# Patient Record
Sex: MALE | Employment: FULL TIME | URBAN - NONMETROPOLITAN AREA
[De-identification: names, ages, dates, MRNs, and addresses within clinical notes are randomized per-mention and may not be internally consistent; named-entity substitution may affect disease eponyms.]

---

## 2017-06-23 ENCOUNTER — NURSE ONLY (OUTPATIENT)
Dept: FAMILY MEDICINE CLINIC | Age: 37
End: 2017-06-23

## 2017-06-23 DIAGNOSIS — Z23 NEED FOR TDAP VACCINATION: Primary | ICD-10-CM

## 2017-06-23 PROCEDURE — 90715 TDAP VACCINE 7 YRS/> IM: CPT | Performed by: NURSE PRACTITIONER

## 2017-06-23 PROCEDURE — 90471 IMMUNIZATION ADMIN: CPT | Performed by: NURSE PRACTITIONER

## 2018-10-18 ENCOUNTER — OFFICE VISIT (OUTPATIENT)
Dept: FAMILY MEDICINE CLINIC | Age: 38
End: 2018-10-18
Payer: COMMERCIAL

## 2018-10-18 VITALS
HEIGHT: 72 IN | DIASTOLIC BLOOD PRESSURE: 82 MMHG | SYSTOLIC BLOOD PRESSURE: 120 MMHG | TEMPERATURE: 98.1 F | WEIGHT: 208.4 LBS | BODY MASS INDEX: 28.23 KG/M2 | HEART RATE: 108 BPM | RESPIRATION RATE: 20 BRPM

## 2018-10-18 DIAGNOSIS — K21.9 GASTROESOPHAGEAL REFLUX DISEASE WITHOUT ESOPHAGITIS: ICD-10-CM

## 2018-10-18 DIAGNOSIS — J02.9 SORE THROAT: Primary | ICD-10-CM

## 2018-10-18 DIAGNOSIS — B96.89 ACUTE BACTERIAL SINUSITIS: ICD-10-CM

## 2018-10-18 DIAGNOSIS — J01.90 ACUTE BACTERIAL SINUSITIS: ICD-10-CM

## 2018-10-18 LAB — STREPTOCOCCUS A RNA: NORMAL

## 2018-10-18 PROCEDURE — 87651 STREP A DNA AMP PROBE: CPT | Performed by: NURSE PRACTITIONER

## 2018-10-18 PROCEDURE — 99213 OFFICE O/P EST LOW 20 MIN: CPT | Performed by: NURSE PRACTITIONER

## 2018-10-18 RX ORDER — TRIAMCINOLONE ACETONIDE 40 MG/ML
40 INJECTION, SUSPENSION INTRA-ARTICULAR; INTRAMUSCULAR ONCE
Status: COMPLETED | OUTPATIENT
Start: 2018-10-18 | End: 2018-10-18

## 2018-10-18 RX ORDER — OMEPRAZOLE 20 MG/1
20 CAPSULE, DELAYED RELEASE ORAL DAILY
Qty: 30 CAPSULE | Refills: 11 | Status: SHIPPED | OUTPATIENT
Start: 2018-10-18 | End: 2019-11-20 | Stop reason: ALTCHOICE

## 2018-10-18 RX ORDER — CEFDINIR 300 MG/1
300 CAPSULE ORAL 2 TIMES DAILY
Qty: 20 CAPSULE | Refills: 0 | Status: SHIPPED | OUTPATIENT
Start: 2018-10-18 | End: 2018-10-28

## 2018-10-18 RX ADMIN — TRIAMCINOLONE ACETONIDE 40 MG: 40 INJECTION, SUSPENSION INTRA-ARTICULAR; INTRAMUSCULAR at 11:41

## 2018-10-18 ASSESSMENT — ENCOUNTER SYMPTOMS
SORE THROAT: 1
SINUS PRESSURE: 1

## 2018-10-18 ASSESSMENT — PATIENT HEALTH QUESTIONNAIRE - PHQ9
SUM OF ALL RESPONSES TO PHQ9 QUESTIONS 1 & 2: 0
2. FEELING DOWN, DEPRESSED OR HOPELESS: 0
SUM OF ALL RESPONSES TO PHQ QUESTIONS 1-9: 0
1. LITTLE INTEREST OR PLEASURE IN DOING THINGS: 0
SUM OF ALL RESPONSES TO PHQ QUESTIONS 1-9: 0

## 2018-10-18 NOTE — PROGRESS NOTES
Administrations This Visit     triamcinolone acetonide (KENALOG-40) injection 40 mg     Admin Date  10/18/2018  11:41 Action  Given Dose  40 mg Route  Intramuscular Site  Ventrogluteal Right Administered By  Manohar Orourke CMA ([facility])    Ordering Provider:  JONNA Hunt CNP    NDC:  3735-1830-11    Lot#:  [id number]    :  [employer] (PRIMARY CARE)    Patient Supplied?:  No                Patient instructed to remain in clinic for 20 minutes after injection and was advised to report any adverse reaction to me immediately.

## 2018-10-18 NOTE — PROGRESS NOTES
Sheryl Ray is a 34 y.o. male whopresents today for :  Chief Complaint   Patient presents with    Pharyngitis     fever yesterday running 103.7 and 104.7 the night before, post nasal drainage, and head congestion last seen 2014    Discuss Medications     would like a script for Prilosec       HPI:     HPI  Fever started Tuesday night. Stayed home yesterday. Fever went down to 102 during day. Last night fever up to 103.7. There is no problem list on file for this patient. Past Medical History:   Diagnosis Date    Chicken pox     Chronic low back pain       Past Surgical History:   Procedure Laterality Date    ANKLE SURGERY  1984    in [facility]    remove staple    ANKLE SURGERY       Family History   Problem Relation Age of Onset    Allergies Mother     Cancer Paternal Grandmother         lung    Cancer Paternal Grandfather         lung     Social History   Substance Use Topics    Smoking status: Current Some Day Smoker     Packs/day: 0.50     Types: Cigarettes    Smokeless tobacco: Never Used    Alcohol use Yes      Comment: beer daily      Current Outpatient Prescriptions   Medication Sig Dispense Refill    Omeprazole Magnesium (PRILOSEC OTC PO) Take by mouth as needed      cefdinir (OMNICEF) 300 MG capsule Take 1 capsule by mouth 2 times daily for 10 days 20 capsule 0    omeprazole (PRILOSEC) 20 MG delayed release capsule Take 1 capsule by mouth daily 30 capsule 11     No current facility-administered medications for this visit. No Known Allergies  Health Maintenance   Topic Date Due    HIV screen  06/18/1993    Lipid screen  06/18/2018    Flu vaccine (1) 09/01/2018    DTaP/Tdap/Td vaccine (2 - Td) 06/23/2027       Subjective:     Review of Systems   Constitutional: Positive for fever. HENT: Positive for congestion, sinus pressure and sore throat. Hematological: Positive for adenopathy.        Objective:     Vitals:    10/18/18 1101   BP: 120/82   Site:

## 2019-09-19 ENCOUNTER — OFFICE VISIT (OUTPATIENT)
Dept: FAMILY MEDICINE CLINIC | Age: 39
End: 2019-09-19
Payer: COMMERCIAL

## 2019-09-19 VITALS
TEMPERATURE: 97.3 F | DIASTOLIC BLOOD PRESSURE: 94 MMHG | HEIGHT: 72 IN | HEART RATE: 86 BPM | RESPIRATION RATE: 18 BRPM | SYSTOLIC BLOOD PRESSURE: 160 MMHG | WEIGHT: 208.2 LBS | BODY MASS INDEX: 28.2 KG/M2 | OXYGEN SATURATION: 98 %

## 2019-09-19 DIAGNOSIS — F41.9 ANXIETY: ICD-10-CM

## 2019-09-19 DIAGNOSIS — I10 ESSENTIAL HYPERTENSION: ICD-10-CM

## 2019-09-19 DIAGNOSIS — K42.9 UMBILICAL HERNIA WITHOUT OBSTRUCTION AND WITHOUT GANGRENE: Primary | ICD-10-CM

## 2019-09-19 PROCEDURE — 99213 OFFICE O/P EST LOW 20 MIN: CPT | Performed by: NURSE PRACTITIONER

## 2019-09-19 RX ORDER — LISINOPRIL 5 MG/1
5 TABLET ORAL DAILY
Qty: 30 TABLET | Refills: 3 | Status: SHIPPED | OUTPATIENT
Start: 2019-09-19 | End: 2019-11-20 | Stop reason: ALTCHOICE

## 2019-09-19 ASSESSMENT — PATIENT HEALTH QUESTIONNAIRE - PHQ9
2. FEELING DOWN, DEPRESSED OR HOPELESS: 0
1. LITTLE INTEREST OR PLEASURE IN DOING THINGS: 0
SUM OF ALL RESPONSES TO PHQ QUESTIONS 1-9: 0
SUM OF ALL RESPONSES TO PHQ9 QUESTIONS 1 & 2: 0
SUM OF ALL RESPONSES TO PHQ QUESTIONS 1-9: 0

## 2019-09-19 ASSESSMENT — ENCOUNTER SYMPTOMS
EYES NEGATIVE: 1
ABDOMINAL PAIN: 1
RESPIRATORY NEGATIVE: 1

## 2019-11-19 PROBLEM — K42.9 UMBILICAL HERNIA WITHOUT OBSTRUCTION AND WITHOUT GANGRENE: Status: ACTIVE | Noted: 2019-11-19

## 2019-11-20 ENCOUNTER — TELEPHONE (OUTPATIENT)
Dept: SURGERY | Age: 39
End: 2019-11-20

## 2019-11-20 ENCOUNTER — OFFICE VISIT (OUTPATIENT)
Dept: SURGERY | Age: 39
End: 2019-11-20
Payer: COMMERCIAL

## 2019-11-20 VITALS
HEART RATE: 100 BPM | BODY MASS INDEX: 26.72 KG/M2 | TEMPERATURE: 98.6 F | SYSTOLIC BLOOD PRESSURE: 132 MMHG | HEIGHT: 72 IN | DIASTOLIC BLOOD PRESSURE: 72 MMHG | WEIGHT: 197.3 LBS | RESPIRATION RATE: 18 BRPM | OXYGEN SATURATION: 99 %

## 2019-11-20 DIAGNOSIS — K42.9 UMBILICAL HERNIA WITHOUT OBSTRUCTION AND WITHOUT GANGRENE: ICD-10-CM

## 2019-11-20 PROCEDURE — 99202 OFFICE O/P NEW SF 15 MIN: CPT | Performed by: SURGERY

## 2019-11-20 ASSESSMENT — ENCOUNTER SYMPTOMS
CHOKING: 0
COLOR CHANGE: 0
EYE ITCHING: 0
ABDOMINAL PAIN: 0
NAUSEA: 0
BACK PAIN: 0
EYE REDNESS: 0
FACIAL SWELLING: 0
SHORTNESS OF BREATH: 0
VOMITING: 0
ANAL BLEEDING: 0
EYE PAIN: 0
EYE DISCHARGE: 0
SORE THROAT: 0
RECTAL PAIN: 0
CHEST TIGHTNESS: 0
PHOTOPHOBIA: 0
BLOOD IN STOOL: 0
APNEA: 0
DIARRHEA: 0
COUGH: 0
STRIDOR: 0
VOICE CHANGE: 0
SINUS PRESSURE: 0
TROUBLE SWALLOWING: 0
RHINORRHEA: 0
ABDOMINAL DISTENTION: 0
CONSTIPATION: 0
ROS SKIN COMMENTS: UMBILICAL HERNIA
WHEEZING: 0

## 2019-12-02 ENCOUNTER — TELEPHONE (OUTPATIENT)
Dept: SURGERY | Age: 39
End: 2019-12-02

## 2019-12-05 DIAGNOSIS — K21.9 GASTROESOPHAGEAL REFLUX DISEASE WITHOUT ESOPHAGITIS: ICD-10-CM

## 2019-12-06 RX ORDER — OMEPRAZOLE 20 MG/1
CAPSULE, DELAYED RELEASE ORAL
Qty: 30 CAPSULE | Refills: 8 | Status: SHIPPED | OUTPATIENT
Start: 2019-12-06

## 2019-12-12 ENCOUNTER — ANESTHESIA (OUTPATIENT)
Dept: OPERATING ROOM | Age: 39
End: 2019-12-12
Payer: COMMERCIAL

## 2019-12-12 ENCOUNTER — ANESTHESIA EVENT (OUTPATIENT)
Dept: OPERATING ROOM | Age: 39
End: 2019-12-12
Payer: COMMERCIAL

## 2019-12-12 ENCOUNTER — HOSPITAL ENCOUNTER (OUTPATIENT)
Age: 39
Setting detail: OUTPATIENT SURGERY
Discharge: HOME | End: 2019-12-12
Attending: SURGERY | Admitting: SURGERY
Payer: COMMERCIAL

## 2019-12-12 VITALS
DIASTOLIC BLOOD PRESSURE: 76 MMHG | BODY MASS INDEX: 25.73 KG/M2 | HEART RATE: 84 BPM | SYSTOLIC BLOOD PRESSURE: 132 MMHG | WEIGHT: 190 LBS | TEMPERATURE: 99 F | RESPIRATION RATE: 16 BRPM | OXYGEN SATURATION: 97 % | HEIGHT: 72 IN

## 2019-12-12 VITALS — DIASTOLIC BLOOD PRESSURE: 74 MMHG | OXYGEN SATURATION: 97 % | SYSTOLIC BLOOD PRESSURE: 136 MMHG

## 2019-12-12 DIAGNOSIS — Z09 S/P UMBILICAL HERNIA REPAIR, FOLLOW-UP EXAM: ICD-10-CM

## 2019-12-12 DIAGNOSIS — K42.9 UMBILICAL HERNIA WITHOUT OBSTRUCTION AND WITHOUT GANGRENE: Primary | ICD-10-CM

## 2019-12-12 PROCEDURE — 3700000001 HC ADD 15 MINUTES (ANESTHESIA): Performed by: SURGERY

## 2019-12-12 PROCEDURE — 6360000002 HC RX W HCPCS: Performed by: NURSE ANESTHETIST, CERTIFIED REGISTERED

## 2019-12-12 PROCEDURE — 6370000000 HC RX 637 (ALT 250 FOR IP): Performed by: SURGERY

## 2019-12-12 PROCEDURE — 7100000011 HC PHASE II RECOVERY - ADDTL 15 MIN: Performed by: SURGERY

## 2019-12-12 PROCEDURE — 3600000012 HC SURGERY LEVEL 2 ADDTL 15MIN: Performed by: SURGERY

## 2019-12-12 PROCEDURE — 6360000002 HC RX W HCPCS: Performed by: SURGERY

## 2019-12-12 PROCEDURE — 2580000003 HC RX 258: Performed by: SURGERY

## 2019-12-12 PROCEDURE — 2500000003 HC RX 250 WO HCPCS: Performed by: SURGERY

## 2019-12-12 PROCEDURE — 7100000010 HC PHASE II RECOVERY - FIRST 15 MIN: Performed by: SURGERY

## 2019-12-12 PROCEDURE — C1781 MESH (IMPLANTABLE): HCPCS | Performed by: SURGERY

## 2019-12-12 PROCEDURE — 3600000002 HC SURGERY LEVEL 2 BASE: Performed by: SURGERY

## 2019-12-12 PROCEDURE — 49587 REPAIR UMBILICAL HERN,5+Y/O,STRANG: CPT | Performed by: SURGERY

## 2019-12-12 PROCEDURE — 2709999900 HC NON-CHARGEABLE SUPPLY: Performed by: SURGERY

## 2019-12-12 PROCEDURE — 3700000000 HC ANESTHESIA ATTENDED CARE: Performed by: SURGERY

## 2019-12-12 DEVICE — IMPLANTABLE DEVICE: Type: IMPLANTABLE DEVICE | Site: UMBILICAL | Status: FUNCTIONAL

## 2019-12-12 RX ORDER — SODIUM CHLORIDE 0.9 % (FLUSH) 0.9 %
10 SYRINGE (ML) INJECTION EVERY 12 HOURS SCHEDULED
Status: DISCONTINUED | OUTPATIENT
Start: 2019-12-12 | End: 2019-12-12 | Stop reason: HOSPADM

## 2019-12-12 RX ORDER — ONDANSETRON 2 MG/ML
INJECTION INTRAMUSCULAR; INTRAVENOUS PRN
Status: DISCONTINUED | OUTPATIENT
Start: 2019-12-12 | End: 2019-12-12 | Stop reason: SDUPTHER

## 2019-12-12 RX ORDER — PROPOFOL 10 MG/ML
INJECTION, EMULSION INTRAVENOUS PRN
Status: DISCONTINUED | OUTPATIENT
Start: 2019-12-12 | End: 2019-12-12 | Stop reason: SDUPTHER

## 2019-12-12 RX ORDER — KETOROLAC TROMETHAMINE 30 MG/ML
30 INJECTION, SOLUTION INTRAMUSCULAR; INTRAVENOUS ONCE
Status: COMPLETED | OUTPATIENT
Start: 2019-12-12 | End: 2019-12-12

## 2019-12-12 RX ORDER — SODIUM CHLORIDE 0.9 % (FLUSH) 0.9 %
10 SYRINGE (ML) INJECTION PRN
Status: DISCONTINUED | OUTPATIENT
Start: 2019-12-12 | End: 2019-12-12 | Stop reason: HOSPADM

## 2019-12-12 RX ORDER — ACETAMINOPHEN AND CODEINE PHOSPHATE 300; 30 MG/1; MG/1
1 TABLET ORAL EVERY 6 HOURS PRN
Qty: 28 TABLET | Refills: 0 | Status: SHIPPED | OUTPATIENT
Start: 2019-12-12 | End: 2019-12-19

## 2019-12-12 RX ORDER — SODIUM CHLORIDE 9 MG/ML
INJECTION, SOLUTION INTRAVENOUS CONTINUOUS
Status: DISCONTINUED | OUTPATIENT
Start: 2019-12-12 | End: 2019-12-12 | Stop reason: HOSPADM

## 2019-12-12 RX ORDER — FENTANYL CITRATE 50 UG/ML
INJECTION, SOLUTION INTRAMUSCULAR; INTRAVENOUS PRN
Status: DISCONTINUED | OUTPATIENT
Start: 2019-12-12 | End: 2019-12-12 | Stop reason: SDUPTHER

## 2019-12-12 RX ORDER — MIDAZOLAM HYDROCHLORIDE 1 MG/ML
INJECTION INTRAMUSCULAR; INTRAVENOUS PRN
Status: DISCONTINUED | OUTPATIENT
Start: 2019-12-12 | End: 2019-12-12 | Stop reason: SDUPTHER

## 2019-12-12 RX ORDER — ACETAMINOPHEN 500 MG
1000 TABLET ORAL ONCE
Status: COMPLETED | OUTPATIENT
Start: 2019-12-12 | End: 2019-12-12

## 2019-12-12 RX ADMIN — PROPOFOL 50 MG: 10 INJECTION, EMULSION INTRAVENOUS at 14:46

## 2019-12-12 RX ADMIN — Medication 2 G: at 14:41

## 2019-12-12 RX ADMIN — ONDANSETRON HYDROCHLORIDE 4 MG: 4 INJECTION, SOLUTION INTRAMUSCULAR; INTRAVENOUS at 14:45

## 2019-12-12 RX ADMIN — MIDAZOLAM HYDROCHLORIDE 2 MG: 1 INJECTION, SOLUTION INTRAMUSCULAR; INTRAVENOUS at 14:37

## 2019-12-12 RX ADMIN — FENTANYL CITRATE 50 MCG: 50 INJECTION INTRAMUSCULAR; INTRAVENOUS at 14:39

## 2019-12-12 RX ADMIN — FENTANYL CITRATE 50 MCG: 50 INJECTION INTRAMUSCULAR; INTRAVENOUS at 14:41

## 2019-12-12 RX ADMIN — KETOROLAC TROMETHAMINE 30 MG: 30 INJECTION, SOLUTION INTRAMUSCULAR at 13:11

## 2019-12-12 RX ADMIN — PROPOFOL 50 MG: 10 INJECTION, EMULSION INTRAVENOUS at 14:43

## 2019-12-12 RX ADMIN — SODIUM CHLORIDE: 9 INJECTION, SOLUTION INTRAVENOUS at 12:52

## 2019-12-12 RX ADMIN — PROPOFOL 50 MG: 10 INJECTION, EMULSION INTRAVENOUS at 14:53

## 2019-12-12 RX ADMIN — ACETAMINOPHEN 1000 MG: 500 TABLET ORAL at 13:11

## 2019-12-12 RX ADMIN — PROPOFOL 50 MG: 10 INJECTION, EMULSION INTRAVENOUS at 14:51

## 2019-12-12 ASSESSMENT — PULMONARY FUNCTION TESTS
PIF_VALUE: 0
PIF_VALUE: 1
PIF_VALUE: 0
PIF_VALUE: 0
PIF_VALUE: 1
PIF_VALUE: 0

## 2019-12-12 ASSESSMENT — PAIN DESCRIPTION - ORIENTATION
ORIENTATION: MID
ORIENTATION: MID

## 2019-12-12 ASSESSMENT — PAIN DESCRIPTION - LOCATION
LOCATION: ABDOMEN
LOCATION: ABDOMEN

## 2019-12-12 ASSESSMENT — PAIN SCALES - GENERAL
PAINLEVEL_OUTOF10: 0
PAINLEVEL_OUTOF10: 0
PAINLEVEL_OUTOF10: 1
PAINLEVEL_OUTOF10: 1

## 2019-12-12 ASSESSMENT — PAIN DESCRIPTION - DESCRIPTORS
DESCRIPTORS: DISCOMFORT
DESCRIPTORS: DISCOMFORT

## 2019-12-12 ASSESSMENT — PAIN DESCRIPTION - PAIN TYPE
TYPE: SURGICAL PAIN
TYPE: SURGICAL PAIN

## 2019-12-12 ASSESSMENT — PAIN - FUNCTIONAL ASSESSMENT: PAIN_FUNCTIONAL_ASSESSMENT: 0-10

## 2019-12-13 ENCOUNTER — TELEPHONE (OUTPATIENT)
Dept: SURGERY | Age: 39
End: 2019-12-13

## 2019-12-23 ENCOUNTER — OFFICE VISIT (OUTPATIENT)
Dept: SURGERY | Age: 39
End: 2019-12-23

## 2019-12-23 VITALS
RESPIRATION RATE: 18 BRPM | TEMPERATURE: 97.8 F | HEART RATE: 95 BPM | HEIGHT: 72 IN | OXYGEN SATURATION: 97 % | DIASTOLIC BLOOD PRESSURE: 80 MMHG | WEIGHT: 190.04 LBS | BODY MASS INDEX: 25.74 KG/M2 | SYSTOLIC BLOOD PRESSURE: 122 MMHG

## 2019-12-23 DIAGNOSIS — Z09 S/P UMBILICAL HERNIA REPAIR, FOLLOW-UP EXAM: Primary | ICD-10-CM

## 2019-12-23 PROCEDURE — 99024 POSTOP FOLLOW-UP VISIT: CPT | Performed by: SURGERY

## 2020-01-11 PROBLEM — Z09 S/P UMBILICAL HERNIA REPAIR, FOLLOW-UP EXAM: Status: RESOLVED | Noted: 2019-12-12 | Resolved: 2020-01-11

## 2020-01-13 ENCOUNTER — OFFICE VISIT (OUTPATIENT)
Dept: FAMILY MEDICINE CLINIC | Age: 40
End: 2020-01-13
Payer: COMMERCIAL

## 2020-01-13 VITALS
BODY MASS INDEX: 25.74 KG/M2 | DIASTOLIC BLOOD PRESSURE: 88 MMHG | HEIGHT: 72 IN | WEIGHT: 190.04 LBS | SYSTOLIC BLOOD PRESSURE: 138 MMHG | HEART RATE: 100 BPM | RESPIRATION RATE: 16 BRPM

## 2020-01-13 PROCEDURE — 99214 OFFICE O/P EST MOD 30 MIN: CPT | Performed by: NURSE PRACTITIONER

## 2020-01-13 RX ORDER — ESCITALOPRAM OXALATE 10 MG/1
10 TABLET ORAL DAILY
Qty: 30 TABLET | Refills: 5 | Status: SHIPPED | OUTPATIENT
Start: 2020-01-13 | End: 2020-02-27

## 2020-01-13 RX ORDER — PREDNISONE 1 MG/1
TABLET ORAL
Qty: 45 TABLET | Refills: 0 | Status: SHIPPED | OUTPATIENT
Start: 2020-01-13 | End: 2020-05-18 | Stop reason: SDUPTHER

## 2020-01-13 ASSESSMENT — PATIENT HEALTH QUESTIONNAIRE - PHQ9
1. LITTLE INTEREST OR PLEASURE IN DOING THINGS: 0
SUM OF ALL RESPONSES TO PHQ QUESTIONS 1-9: 0
SUM OF ALL RESPONSES TO PHQ QUESTIONS 1-9: 0
SUM OF ALL RESPONSES TO PHQ9 QUESTIONS 1 & 2: 0
2. FEELING DOWN, DEPRESSED OR HOPELESS: 0

## 2020-01-13 ASSESSMENT — ENCOUNTER SYMPTOMS
EYES NEGATIVE: 1
GASTROINTESTINAL NEGATIVE: 1
RESPIRATORY NEGATIVE: 1

## 2020-01-13 NOTE — LETTER
[address] [facility] [address]  Phone: [phone]  Fax: [phone]    JONNA Thorpe CNP        January 13, 2020     Patient: Dell Smith   YOB: 1980   Date of Visit: 1/13/2020       To Whom It May Concern: It is my medical opinion that Eduarda Crawford should remain out of work until 1/20/2020. If you have any questions or concerns, please don't hesitate to call.     Sincerely,          JONNA Thorpe CNP

## 2020-01-13 NOTE — PROGRESS NOTES
day 1 taper by 5mg daily 45 tablet 0    omeprazole (PRILOSEC) 20 MG delayed release capsule take 1 capsule by mouth once daily 30 capsule 8     No current facility-administered medications for this visit. No Known Allergies  Health Maintenance   Topic Date Due    Pneumococcal 0-64 years Vaccine (1 of 1 - PPSV23) 06/18/1984    HIV screen  06/18/1993    Lipid screen  06/18/2018    Diabetes screen  06/18/2018    Flu vaccine (1) 09/19/2020 (Originally 9/1/2019)    DTaP/Tdap/Td vaccine (2 - Td) 06/23/2027       Subjective:     Review of Systems   Constitutional: Negative. HENT: Negative. Eyes: Negative. Respiratory: Negative. Cardiovascular: Negative. Gastrointestinal: Negative. Musculoskeletal: Positive for joint swelling and myalgias. Skin: Negative. Neurological: Negative. Psychiatric/Behavioral: The patient is nervous/anxious. Objective:     Vitals:    01/13/20 1439 01/13/20 1513   BP: (!) 140/96 138/88   Site: Left Upper Arm    Position: Sitting    Cuff Size: Medium Adult    Pulse: 100    Resp: 16    Weight: 190 lb 0.6 oz (86.2 kg)    Height: 6' 0.01\" (1.829 m)        Physical Exam  Constitutional:       Appearance: He is well-developed. HENT:      Head: Normocephalic. Right Ear: Tympanic membrane and external ear normal.      Left Ear: Tympanic membrane and external ear normal.      Nose: Nose normal.   Neck:      Musculoskeletal: Normal range of motion and neck supple. Cardiovascular:      Rate and Rhythm: Normal rate and regular rhythm. Heart sounds: Normal heart sounds. No murmur. No friction rub. No gallop. Pulmonary:      Effort: Pulmonary effort is normal.      Breath sounds: Normal breath sounds. No wheezing or rales. Abdominal:      General: Bowel sounds are normal.      Palpations: Abdomen is soft. Tenderness: There is no tenderness. There is no guarding. Musculoskeletal: Normal range of motion. Left ankle: He exhibits swelling.

## 2020-01-14 RX ORDER — OMEPRAZOLE 20 MG/1
CAPSULE, DELAYED RELEASE ORAL
Qty: 30 CAPSULE | Refills: 8 | Status: CANCELLED | OUTPATIENT
Start: 2020-01-14

## 2020-01-14 NOTE — TELEPHONE ENCOUNTER
Patient asking for only Prilosec to be dispensed as the generic Omeprazole does not work for him. Please advise.

## 2020-01-17 ENCOUNTER — TELEPHONE (OUTPATIENT)
Dept: FAMILY MEDICINE CLINIC | Age: 40
End: 2020-01-17

## 2020-01-17 NOTE — TELEPHONE ENCOUNTER
Letter was written on 1/13/20    Attempted to reach, left detailed message regarding letter ready for .

## 2020-02-27 ENCOUNTER — OFFICE VISIT (OUTPATIENT)
Dept: FAMILY MEDICINE CLINIC | Age: 40
End: 2020-02-27
Payer: COMMERCIAL

## 2020-02-27 VITALS
TEMPERATURE: 98 F | BODY MASS INDEX: 24.35 KG/M2 | HEART RATE: 82 BPM | RESPIRATION RATE: 14 BRPM | DIASTOLIC BLOOD PRESSURE: 82 MMHG | WEIGHT: 179.8 LBS | SYSTOLIC BLOOD PRESSURE: 124 MMHG | HEIGHT: 72 IN

## 2020-02-27 PROCEDURE — 99214 OFFICE O/P EST MOD 30 MIN: CPT | Performed by: NURSE PRACTITIONER

## 2020-02-27 RX ORDER — ESCITALOPRAM OXALATE 20 MG/1
20 TABLET ORAL DAILY
Qty: 30 TABLET | Refills: 5 | Status: SHIPPED | OUTPATIENT
Start: 2020-02-27 | End: 2020-06-11

## 2020-02-27 RX ORDER — ALPRAZOLAM 0.25 MG/1
0.25 TABLET ORAL 3 TIMES DAILY PRN
Qty: 21 TABLET | Refills: 1 | Status: SHIPPED | OUTPATIENT
Start: 2020-02-27 | End: 2020-03-17 | Stop reason: SDUPTHER

## 2020-02-27 ASSESSMENT — ENCOUNTER SYMPTOMS
DIARRHEA: 1
VOMITING: 1
RESPIRATORY NEGATIVE: 1
EYES NEGATIVE: 1
NAUSEA: 1

## 2020-02-27 NOTE — PROGRESS NOTES
current facility-administered medications for this visit. No Known Allergies  Health Maintenance   Topic Date Due    Pneumococcal 0-64 years Vaccine (1 of 1 - PPSV23) 06/18/1984    HIV screen  06/18/1993    Lipid screen  06/18/2018    Diabetes screen  06/18/2018    Flu vaccine (1) 09/19/2020 (Originally 9/1/2019)    DTaP/Tdap/Td vaccine (2 - Td) 06/23/2027    Shingles Vaccine (1 of 2) 06/18/2028    Hepatitis A vaccine  Aged Out    Hepatitis B vaccine  Aged Out    Hib vaccine  Aged Out    Meningococcal (ACWY) vaccine  Aged Out       Subjective:     Review of Systems   Constitutional: Negative. HENT: Negative. Eyes: Negative. Respiratory: Negative. Cardiovascular: Negative. Gastrointestinal: Positive for diarrhea, nausea and vomiting. Musculoskeletal: Negative. Skin: Negative. Neurological: Negative. Psychiatric/Behavioral: The patient is nervous/anxious. Objective:     Vitals:    02/27/20 1338 02/27/20 1646   BP: (!) 142/90 124/82   Site: Left Upper Arm    Position: Sitting    Cuff Size: Medium Adult    Pulse: 82    Resp: 14    Temp: 98 °F (36.7 °C)    TempSrc: Temporal    Weight: 179 lb 12.8 oz (81.6 kg)    Height: 6' 0.01\" (1.829 m)        Physical Exam  Constitutional:       Appearance: He is well-developed. HENT:      Head: Normocephalic. Right Ear: Tympanic membrane and external ear normal.      Left Ear: Tympanic membrane and external ear normal.      Nose: Nose normal.   Neck:      Musculoskeletal: Normal range of motion and neck supple. Cardiovascular:      Rate and Rhythm: Normal rate and regular rhythm. Heart sounds: Normal heart sounds. No murmur. No friction rub. No gallop. Pulmonary:      Effort: Pulmonary effort is normal.      Breath sounds: Normal breath sounds. No wheezing or rales. Abdominal:      General: Bowel sounds are normal.      Palpations: Abdomen is soft. Tenderness: There is no abdominal tenderness.  There is no

## 2020-03-17 RX ORDER — ALPRAZOLAM 0.25 MG/1
0.25 TABLET ORAL 3 TIMES DAILY PRN
Qty: 21 TABLET | Refills: 1 | Status: SHIPPED | OUTPATIENT
Start: 2020-03-17 | End: 2020-06-11 | Stop reason: SDUPTHER

## 2020-03-17 NOTE — TELEPHONE ENCOUNTER
Patient currently taking Lexapro 20mg and xanax 0.25mg and is doing well on both    His script for xanax is written 1 tab up to 3 x daily for 7 days. He is only taking 1 tab bid He is wanting new script for the xanax 30 day supply if possible, states would be more convenient for him.     [facility]OO [facility]

## 2020-03-23 ENCOUNTER — TELEPHONE (OUTPATIENT)
Dept: FAMILY MEDICINE CLINIC | Age: 40
End: 2020-03-23

## 2020-03-23 NOTE — TELEPHONE ENCOUNTER
Pt should not go to work.   Current guidelines are to be off work for at least 3 days after symptoms resolve

## 2020-03-23 NOTE — TELEPHONE ENCOUNTER
See email forwarded from patient. He had fever 102 last night took motrin.     99 this am.    Wants to return to work

## 2020-05-18 ENCOUNTER — TELEPHONE (OUTPATIENT)
Dept: FAMILY MEDICINE CLINIC | Age: 40
End: 2020-05-18

## 2020-05-18 RX ORDER — PREDNISONE 1 MG/1
TABLET ORAL
Qty: 45 TABLET | Refills: 0 | Status: SHIPPED | OUTPATIENT
Start: 2020-05-18 | End: 2020-05-28

## 2020-06-11 ENCOUNTER — OFFICE VISIT (OUTPATIENT)
Dept: FAMILY MEDICINE CLINIC | Age: 40
End: 2020-06-11
Payer: COMMERCIAL

## 2020-06-11 VITALS
SYSTOLIC BLOOD PRESSURE: 120 MMHG | WEIGHT: 175 LBS | HEART RATE: 74 BPM | TEMPERATURE: 98 F | RESPIRATION RATE: 16 BRPM | BODY MASS INDEX: 23.73 KG/M2 | DIASTOLIC BLOOD PRESSURE: 74 MMHG

## 2020-06-11 PROCEDURE — 99215 OFFICE O/P EST HI 40 MIN: CPT | Performed by: FAMILY MEDICINE

## 2020-06-11 RX ORDER — PREDNISONE 50 MG/1
50 TABLET ORAL DAILY
Qty: 5 TABLET | Refills: 0 | Status: SHIPPED | OUTPATIENT
Start: 2020-06-11 | End: 2020-06-16

## 2020-06-11 RX ORDER — ALPRAZOLAM 0.25 MG/1
0.25 TABLET ORAL 3 TIMES DAILY PRN
Qty: 21 TABLET | Refills: 1 | Status: SHIPPED | OUTPATIENT
Start: 2020-06-11 | End: 2020-08-14 | Stop reason: SDUPTHER

## 2020-06-11 RX ORDER — DESVENLAFAXINE 50 MG/1
50 TABLET, EXTENDED RELEASE ORAL DAILY
Qty: 14 TABLET | Refills: 0 | Status: SHIPPED | OUTPATIENT
Start: 2020-06-11 | End: 2021-08-03

## 2020-06-11 RX ORDER — DESVENLAFAXINE 100 MG/1
100 TABLET, EXTENDED RELEASE ORAL DAILY
Qty: 30 TABLET | Refills: 5 | Status: SHIPPED | OUTPATIENT
Start: 2020-06-11 | End: 2021-08-03

## 2020-06-11 RX ORDER — PANTOPRAZOLE SODIUM 40 MG/1
40 TABLET, DELAYED RELEASE ORAL
Qty: 90 TABLET | Refills: 1 | Status: SHIPPED | OUTPATIENT
Start: 2020-06-11 | End: 2021-08-03

## 2020-06-11 RX ORDER — AZITHROMYCIN 250 MG/1
TABLET, FILM COATED ORAL
Qty: 1 PACKET | Refills: 0 | Status: SHIPPED | OUTPATIENT
Start: 2020-06-11 | End: 2022-01-18 | Stop reason: ALTCHOICE

## 2020-06-16 NOTE — PROGRESS NOTES
Office notes and demographics faxed to vein center
Vein clonic not open today.
worsen or fail to improve as anticipated.     Over 50 minutes spent with patient with >50% spent in counseling and coordination of care    Brooklyn Solis MD

## 2020-07-07 ENCOUNTER — TELEPHONE (OUTPATIENT)
Dept: FAMILY MEDICINE CLINIC | Age: 40
End: 2020-07-07

## 2020-07-07 NOTE — TELEPHONE ENCOUNTER
Patient will be coming back from [place] on 7/15/20 and would like to be tested for COVID, please advise at [phone]

## 2020-07-23 ENCOUNTER — TELEPHONE (OUTPATIENT)
Dept: FAMILY MEDICINE CLINIC | Age: 40
End: 2020-07-23

## 2020-07-23 NOTE — TELEPHONE ENCOUNTER
Which med the heart burn pill or the anxiety med. The anxiety med is at its max dose. For face masks we have been handing out notes.   There are few medical reasons that allow a person to not wear one

## 2020-07-23 NOTE — TELEPHONE ENCOUNTER
Patient was scheduled today for a 6 week f/u. He was called in to work and needs to r/s. Dr. María Berrios does not have anything open for me to schedule for several weeks and patient states he needs to be seen sooner. Patient states the medication is working good but would like a stronger dose. Would also like to have a written letter stating he does not need to wear a mask while at work.      Patient can be reached at [phone]

## 2020-08-14 RX ORDER — ALPRAZOLAM 0.25 MG/1
0.25 TABLET ORAL 3 TIMES DAILY PRN
Qty: 21 TABLET | Refills: 1 | Status: SHIPPED | OUTPATIENT
Start: 2020-08-14 | End: 2020-08-21

## 2020-08-14 NOTE — TELEPHONE ENCOUNTER
Kayele Nicholas called requesting a refill on the following medications:  Requested Prescriptions     Pending Prescriptions Disp Refills    ALPRAZolam (XANAX) 0.25 MG tablet 21 tablet 1     Sig: Take 1 tablet by mouth 3 times daily as needed for Anxiety for up to 7 days.      Pharmacy [facility]: [facility] [place]      Date of last visit: 6/11/20  Date of next visit (if applicable): Visit date not found

## 2020-11-03 ENCOUNTER — TELEPHONE (OUTPATIENT)
Dept: FAMILY MEDICINE CLINIC | Age: 40
End: 2020-11-03

## 2020-11-03 NOTE — TELEPHONE ENCOUNTER
Patient is calling in asking for an order to be checked for covid 19. He said for about 3-4 days he has been having sore throat, cough, runny nose. His work sent him to a [facility] clinic yesterday, they tried to check for covid but they made him bleed and couldn't use it. He would like to go anywhere that he can get tested and get results back asap.  Please call him to advise if ok for order or if need vv?

## 2020-11-04 LAB — SARS-COV-2: NOT DETECTED

## 2021-07-29 ENCOUNTER — NURSE TRIAGE (OUTPATIENT)
Dept: OTHER | Facility: CLINIC | Age: 41
End: 2021-07-29

## 2021-07-29 ENCOUNTER — TELEPHONE (OUTPATIENT)
Dept: FAMILY MEDICINE CLINIC | Age: 41
End: 2021-07-29

## 2021-07-29 DIAGNOSIS — K21.9 GASTROESOPHAGEAL REFLUX DISEASE WITHOUT ESOPHAGITIS: ICD-10-CM

## 2021-07-29 DIAGNOSIS — R19.7 DIARRHEA OF PRESUMED INFECTIOUS ORIGIN: Primary | ICD-10-CM

## 2021-07-29 NOTE — TELEPHONE ENCOUNTER
Reason for Disposition   Vomiting occurs with dizziness    Answer Assessment - Initial Assessment Questions  1. DESCRIPTION: \"Describe your dizziness. \"         Light headed, nausea, felt like he could pass out, dizzy spell, coworker told him he looked green     2. LIGHTHEADED: \"Do you feel lightheaded? \" (e.g., somewhat faint, woozy, weak upon standing)          Light headed, woozy, weak like passing out     3. VERTIGO: \"Do you feel like either you or the room is spinning or tilting? \" (i.e. vertigo)           At times felt like things were spinning, if he would have closed his eyes he feels it really would have felt like the room would have been spinning     4. SEVERITY: \"How bad is it? \"  \"Do you feel like you are going to faint? \" \"Can you stand and walk? \"    - MILD - walking normally    - MODERATE - interferes with normal activities (e.g., work, school)     - SEVERE - unable to stand, requires support to walk, feels like passing out now. Just woke after sleeping 10 hrs, doesn't feel dizzy at this time, not like fainting at this time- 'doesn't feel right but not terrible\"     5. ONSET:  \"When did the dizziness begin? \"         Started about 4 days ago with nausea, vomiting x24hrs, fever 101.4, diarrhea, light headed     6. AGGRAVATING FACTORS: \"Does anything make it worse? \" (e.g., standing, change in head position)           Looking at the computer he got dizzy, standing outside he didn't feel well    7. HEART RATE: \"Can you tell me your heart rate? \" \"How many beats in 15 seconds? \"  (Note: not all patients can do this)              Denies any racing heart or anything with heart     8. CAUSE: \"What do you think is causing the dizziness? \"           Unsure     9. RECURRENT SYMPTOM: \"Have you had dizziness before? \" If so, ask: \"When was the last time? \" \"What happened that time? \"            Pt has been with standing too quickly and it is a brief dizziness and subsides     10.  OTHER SYMPTOMS: \"Do you have any

## 2021-08-02 ENCOUNTER — TELEPHONE (OUTPATIENT)
Dept: FAMILY MEDICINE CLINIC | Age: 41
End: 2021-08-02

## 2021-08-02 NOTE — TELEPHONE ENCOUNTER
----- Message from [facility] sent at 8/2/2021  9:56 AM EDT -----  Subject: Appointment Request    Reason for Call: Routine (Patient Request) No Script    QUESTIONS  Type of Appointment? Established Patient  Reason for appointment request? Available appointments did not meet   patient need  Additional Information for Provider? Patient called on 08/02 in regards to   wanting to see the provider or Jermaine Maynard, due to his left foot. Patient   stated he called last week and still having a lot of concerns for his left   foot and not being able pull it back and wanting to see the if he can get   in today or tomorrow the earliest. Patient stated due to issue, he has   been tripping a lot in just a couple of days not normal.   ---------------------------------------------------------------------------  --------------  CALL BACK INFO  What is the best way for the office to contact you? OK to leave message on   voicemail  Preferred Call Back Phone Number? [phone]  ---------------------------------------------------------------------------  --------------  SCRIPT ANSWERS  Relationship to Patient? Self  (Is the patient requesting to see the provider for a procedure?)? No  (Is the patient requesting to see the provider urgently  today or   tomorrow. )? No  Have you been diagnosed with, awaiting test results for, or told that you   are suspected of having COVID-19 (Coronavirus)? (If patient has tested   negative or was tested as a requirement for work, school, or travel and   not based on symptoms, answer no)? No  Do you currently have flu-like symptoms including fever or chills, cough,   shortness of breath, difficulty breathing, or new loss of taste or smell? No  Have you had close contact with someone with COVID-19 in the last 14 days? No  (Service Expert  click yes below to proceed with [employer] As Usual   Scheduling)?  Yes

## 2021-08-03 ENCOUNTER — OFFICE VISIT (OUTPATIENT)
Dept: FAMILY MEDICINE CLINIC | Age: 41
End: 2021-08-03
Payer: COMMERCIAL

## 2021-08-03 VITALS
HEART RATE: 97 BPM | TEMPERATURE: 97.6 F | DIASTOLIC BLOOD PRESSURE: 92 MMHG | OXYGEN SATURATION: 98 % | RESPIRATION RATE: 18 BRPM | WEIGHT: 186.6 LBS | SYSTOLIC BLOOD PRESSURE: 144 MMHG | HEIGHT: 72 IN | BODY MASS INDEX: 25.27 KG/M2

## 2021-08-03 DIAGNOSIS — M21.372 LEFT FOOT DROP: Primary | ICD-10-CM

## 2021-08-03 PROCEDURE — 99214 OFFICE O/P EST MOD 30 MIN: CPT | Performed by: NURSE PRACTITIONER

## 2021-08-03 RX ORDER — PREDNISONE 1 MG/1
TABLET ORAL
Qty: 45 TABLET | Refills: 0 | Status: SHIPPED | OUTPATIENT
Start: 2021-08-03 | End: 2021-08-13

## 2021-08-03 RX ORDER — TIZANIDINE 4 MG/1
4 TABLET ORAL 3 TIMES DAILY PRN
Qty: 30 TABLET | Refills: 0 | Status: SHIPPED | OUTPATIENT
Start: 2021-08-03

## 2021-08-03 ASSESSMENT — ENCOUNTER SYMPTOMS
EYES NEGATIVE: 1
RESPIRATORY NEGATIVE: 1
GASTROINTESTINAL NEGATIVE: 1

## 2021-08-03 NOTE — LETTER
[address] [facility] [address]  Phone: [phone]  Fax: [phone]    JONNA Becker CNP        August 3, 2021     Patient: Dipti Glez   YOB: 1980   Date of Visit: 8/3/2021       To Whom It May Concern: It is my medical opinion that Sanjuanita Lazaro should remain out of work until 8/10/21. Tacho Orourke has acute drop foot of left leg following an apparent viral illness. He is being referred to a specialist, treated with rest and steroids. If you have any questions or concerns, please don't hesitate to call.     Sincerely,          JONNA Becker CNP

## 2021-08-03 NOTE — PROGRESS NOTES
Whitney Crook is a 35 y.o. male whopresents today for :  Chief Complaint   Patient presents with    Foot Pain     left foot - Pt feels it's drop foot. Pt can't pull toes back or pull ankle back. Ongoing the past 4 days    Shoulder Pain     Rt shoulder x1 day        HPI:     HPI     very interesting pt. Reports last week he was very ill with a GI illness. Terrible diarrhea and fatigue for a few days. Towards the last day he got out of bed and fell. He thinks he fell because he couldn't lift his left foot but unsure as he could have injured in fall. However since then GI is better but has very limited dorseflexion on left foot. Right is fine. Has slight loss in sensation. No issues with back knee or hip. Also has chronic neck and right shoulder pain.     Patient Active Problem List   Diagnosis    Umbilical hernia without obstruction and without gangrene        Past Medical History:   Diagnosis Date    Anxiety     Chicken pox     Chronic low back pain     Fractures, stress 2012    right    GERD (gastroesophageal reflux disease)     Hypertension     no meds    Umbilical hernia 38/5717      Past Surgical History:   Procedure Laterality Date    ANKLE SURGERY  1984    in [facility] Bilateral 1996    remove staple-Dr Sanabria    TONSILLECTOMY AND ADENOIDECTOMY      as a child    UMBILICAL HERNIA REPAIR N/A 90/39/5222    HERNIA UMBILICAL REPAIR WITH MESH performed by Jose Guadalupe Reyna MD at [address] [place] [address] History   Problem Relation Age of Onset    Allergies Mother     Cancer Paternal Grandmother         lung    Cancer Paternal Grandfather         lung    No Known Problems Father      Social History     Tobacco Use    Smoking status: Current Some Day Smoker     Packs/day: 0.50     Types: Cigarettes    Smokeless tobacco: Never Used   Substance Use Topics    Alcohol use: Yes     Comment: beer daily      Current Outpatient Medications   Medication Sig Dispense Refill    predniSONE (DELTASONE) 5 MG tablet 9 tabs po day 1 taper by 5mg daily 45 tablet 0    tiZANidine (ZANAFLEX) 4 MG tablet Take 1 tablet by mouth 3 times daily as needed (muscle pain) 30 tablet 0    azithromycin (ZITHROMAX) 250 MG tablet Take 2 tabs (500 mg) on Day 1, and take 1 tab (250 mg) on days 2 through 5. 1 packet 0    omeprazole (PRILOSEC) 20 MG delayed release capsule take 1 capsule by mouth once daily 30 capsule 8    PRILOSEC 20 MG delayed release capsule Take 1 capsule by mouth 2 times daily 30 capsule 11     No current facility-administered medications for this visit. No Known Allergies  Health Maintenance   Topic Date Due    Hepatitis C screen  Never done    Pneumococcal 0-64 years Vaccine (1 of 2 - PPSV23) Never done    COVID-19 Vaccine (1) Never done    HIV screen  Never done    Lipid screen  Never done    Diabetes screen  Never done    Flu vaccine (1) 09/01/2021    DTaP/Tdap/Td vaccine (2 - Td or Tdap) 06/23/2027    Hepatitis A vaccine  Aged Out    Hepatitis B vaccine  Aged Out    Hib vaccine  Aged Out    Meningococcal (ACWY) vaccine  Aged Out       Subjective:     Review of Systems   Constitutional: Negative. HENT: Negative. Eyes: Negative. Respiratory: Negative. Cardiovascular: Negative. Gastrointestinal: Negative. Musculoskeletal: Positive for myalgias. Skin: Negative. Neurological: Positive for weakness. Objective:     Vitals:    08/03/21 1442   BP: (!) 144/92   Site: Left Upper Arm   Position: Sitting   Cuff Size: Medium Adult   Pulse: 97   Resp: 18   Temp: 97.6 °F (36.4 °C)   TempSrc: Temporal   SpO2: 98%   Weight: 186 lb 9.6 oz (84.6 kg)   Height: 6' 0.01\" (1.829 m)       Physical Exam  Constitutional:       Appearance: He is well-developed. HENT:      Head: Normocephalic.       Right Ear: Tympanic membrane and external ear normal.      Left Ear: Tympanic membrane and external ear normal.      Nose: Nose normal.   Cardiovascular:      Rate and Rhythm: Normal rate and regular rhythm. Heart sounds: Normal heart sounds. No murmur heard. No friction rub. No gallop. Pulmonary:      Effort: Pulmonary effort is normal.      Breath sounds: Normal breath sounds. No wheezing or rales. Abdominal:      General: Bowel sounds are normal.      Palpations: Abdomen is soft. Tenderness: There is no abdominal tenderness. There is no guarding. Musculoskeletal:         General: Normal range of motion. Cervical back: Normal range of motion and neck supple. Legs:    Lymphadenopathy:      Cervical: No cervical adenopathy. Skin:     General: Skin is warm. Neurological:      Mental Status: He is alert and oriented to person, place, and time. Deep Tendon Reflexes: Reflexes are normal and symmetric. Assessment:      Diagnosis Orders   1. Left foot drop  predniSONE (DELTASONE) 5 MG tablet    Alejandra Owens MD, [address], [address]:      No follow-ups on file. Orders Placed This Encounter   Procedures   Alejandra Owens MD, [address], [facility]     Referral Priority:   Urgent     Referral Type:   Eval and Treat     Referral Reason:   Specialty Services Required     Requested Specialty:   Pain Medicine     Number of Visits Requested:   1     Orders Placed This Encounter   Medications    predniSONE (DELTASONE) 5 MG tablet     Si tabs po day 1 taper by 5mg daily     Dispense:  45 tablet     Refill:  0    tiZANidine (ZANAFLEX) 4 MG tablet     Sig: Take 1 tablet by mouth 3 times daily as needed (muscle pain)     Dispense:  30 tablet     Refill:  0      Concerned with his foot drop. Unsure of cause. ?post viral, injur during fall. Will start prednisone and refer to physiatry  Did advised if has progressive weakness to let us know right away. Rule out GBS. Did give work note to rest until foot is stabilized.   He is having problems walking right now     Patient given educational materials - seepatient instructions. Discussed use, benefit, and side effects of prescribed medications. All patient questions answered. Pt voiced understanding. Patient agreed withtreatment plan. Follow up as directed.      Electronically signed by JONNA Mcgovern CNP on 8/3/2021 at 5:18 PM

## 2021-08-10 ENCOUNTER — TELEPHONE (OUTPATIENT)
Dept: FAMILY MEDICINE CLINIC | Age: 41
End: 2021-08-10

## 2021-08-10 DIAGNOSIS — M21.372 LEFT FOOT DROP: Primary | ICD-10-CM

## 2021-08-10 NOTE — TELEPHONE ENCOUNTER
LM on Michelle he at [facility] to call back to check on status of referral    Per Kedar Cerda at Dr Andreas Gonsales office, her next opening for new  pt is 8/30    Spoke with Rafiq Pace at Dr Keyana Barreto office, his next opening is mid/end of September. Was able to schedule with Dr Paul Pat next Tuesday 8/17 @ 845. Pt needs to be there by 830. [facility] [address] [facility] [place] [address] [phone]. Need ins card, id and list of meds    Patient aware and voiced understanding, no concerns voiced at this time. Pt requests that note state that he has an appt with podiatrist 8/17/21. Emailed updated work note to [email or web address]      Need referral entered and fax to Dr Paul Pat at [phone]

## 2021-08-10 NOTE — LETTER
[address] [facility] [address]  Phone: [phone]  Fax: [phone]    JONNA Moe CNP        August 10, 2021    To whom it concerns,    It is my medical opinion that Claritza Alaniz should remain out of work until 8/18/21. Malena Walker has acute drop foot of left leg following an apparent viral illness. He is being treated with rest and steroids. Malena Walker has been referred to a podiatrist Dr Gaurang Joseph. He has an appointment scheduled 8/17/21@ 845 am and future work status will be determined at that time by Dr Gaurang Joseph. If you have any questions or concerns, please don't hesitate to call.       Sincerely,        JONNA Moe CNP

## 2021-08-10 NOTE — TELEPHONE ENCOUNTER
Extend note another week. Also call dr lancaster or dr Kaity Hernandes and see if a podiatrist can see him right away.

## 2021-08-10 NOTE — TELEPHONE ENCOUNTER
----- Message from Ghanshyam Vuong sent at 8/10/2021  8:14 AM EDT -----  Subject: Referral Request    QUESTIONS   Reason for referral request? specialist for foot   Has the physician seen you for this condition before? No   Preferred Specialist (if applicable)? Do you already have an appointment scheduled? No  Additional Information for Provider? Pt is concerned because he is   scheduled to go back to work tomorrow, but he still hasn't seen a   specialist for his foot. He called [facility] yesterday to ask about   progress, but was told they were still in review for which doctor they   would assign him to. He needs to see a specialist before he goes back to   work. Given that nothing has changed, should he still go back to work   tomorrow? He still can't move his toes, which makes putting on shoes and   socks difficult.  ---------------------------------------------------------------------------  --------------  CALL BACK INFO  What is the best way for the office to contact you? OK to leave message on   voicemail  Preferred Call Back Phone Number?  [phone]

## 2022-01-18 ENCOUNTER — OFFICE VISIT (OUTPATIENT)
Dept: FAMILY MEDICINE CLINIC | Age: 42
End: 2022-01-18
Payer: COMMERCIAL

## 2022-01-18 ENCOUNTER — TELEPHONE (OUTPATIENT)
Dept: FAMILY MEDICINE CLINIC | Age: 42
End: 2022-01-18

## 2022-01-18 VITALS
TEMPERATURE: 98.9 F | DIASTOLIC BLOOD PRESSURE: 80 MMHG | SYSTOLIC BLOOD PRESSURE: 132 MMHG | RESPIRATION RATE: 18 BRPM | OXYGEN SATURATION: 95 % | HEART RATE: 103 BPM | WEIGHT: 195.2 LBS | BODY MASS INDEX: 26.47 KG/M2

## 2022-01-18 DIAGNOSIS — R09.81 NASAL CONGESTION: ICD-10-CM

## 2022-01-18 DIAGNOSIS — R05.9 COUGH: Primary | ICD-10-CM

## 2022-01-18 DIAGNOSIS — R06.02 SHORTNESS OF BREATH: ICD-10-CM

## 2022-01-18 LAB
Lab: NORMAL
PERFORMING INSTRUMENT: NORMAL
QC PASS/FAIL: NORMAL
SARS-COV-2, POC: NORMAL

## 2022-01-18 PROCEDURE — 87426 SARSCOV CORONAVIRUS AG IA: CPT | Performed by: NURSE PRACTITIONER

## 2022-01-18 PROCEDURE — 99213 OFFICE O/P EST LOW 20 MIN: CPT | Performed by: NURSE PRACTITIONER

## 2022-01-18 RX ORDER — BENZONATATE 200 MG/1
200 CAPSULE ORAL 3 TIMES DAILY PRN
Qty: 30 CAPSULE | Refills: 0 | Status: SHIPPED | OUTPATIENT
Start: 2022-01-18 | End: 2022-01-25

## 2022-01-18 RX ORDER — AMOXICILLIN 500 MG/1
500 CAPSULE ORAL 3 TIMES DAILY
Qty: 30 CAPSULE | Refills: 0 | Status: SHIPPED | OUTPATIENT
Start: 2022-01-18 | End: 2022-01-28

## 2022-01-18 ASSESSMENT — ENCOUNTER SYMPTOMS
DIARRHEA: 1
COUGH: 1
EYES NEGATIVE: 1

## 2022-01-18 NOTE — TELEPHONE ENCOUNTER
----- Message from Miller Cruz sent at 1/18/2022 12:01 PM EST -----  Subject: Appointment Request    Reason for Call: Urgent (Patient Request) No Script    QUESTIONS  Type of Appointment? Established Patient  Reason for appointment request? Available appointments did not meet   patient need  Additional Information for Provider? Pt called in with symptoms including   nasal congestion, runny nose, diarrhea and body aches all beginning on   Saturday 1/15. He stated that he took an at home covid test on Sunday 1/16   which was negative. I offered him a VV but he would prefer to be seen in   office. I was unable to get through to the office to see if that was   possible. He is willing to see any physician in the practice for this   visit. Please advise.  ---------------------------------------------------------------------------  --------------  CALL BACK INFO  What is the best way for the office to contact you? OK to leave message on   voicemail  Preferred Call Back Phone Number? [phone]  ---------------------------------------------------------------------------  --------------  SCRIPT ANSWERS  Relationship to Patient? Self  (Is the patient requesting to see the provider for a procedure?)? No  (Is the patient requesting to see the provider urgently  today or   tomorrow. )? Yes  Have you been diagnosed with, awaiting test results for, or told that you   are suspected of having COVID-19 (Coronavirus)? (If patient has tested   negative or was tested as a requirement for work, school, or travel and   not based on symptoms, answer no)? No  Within the past two weeks have you developed any of the following symptoms   (answer no if symptoms have been present longer than 2 weeks or began   more than 2 weeks ago)?  Fever or Chills, Cough, Shortness of breath or   difficulty breathing, Loss of taste or smell, Sore throat, Nasal   congestion, Sneezing or runny nose, Fatigue or generalized body aches   (answer no if pain is specific to a body part e.g. back pain), Diarrhea,   Headache?  Yes

## 2022-01-18 NOTE — PROGRESS NOTES
Salvador Aguayo is a 35 y.o. male whopresents today for :  Chief Complaint   Patient presents with    Cough     w/ yellow phlegm    Diarrhea     symptoms started saturday    Nasal Congestion    Generalized Body Aches       HPI:     HPI  Started with illness Saturday. Cough, diarrhea, very congested. Body aches.   Did take covid test Sunday that was negative       Patient Active Problem List   Diagnosis    Umbilical hernia without obstruction and without gangrene        Past Medical History:   Diagnosis Date    Anxiety     Chicken pox     Chronic low back pain     Fractures, stress 2012    right    GERD (gastroesophageal reflux disease)     Hypertension     no meds    Umbilical hernia 84/6394      Past Surgical History:   Procedure Laterality Date    [facility]    remove staple-Dr Buitrago      as a child    UMBILICAL HERNIA REPAIR N/A 96/83/2555    HERNIA UMBILICAL REPAIR WITH MESH performed by Everette Garcia MD at [address] [place] [address] History   Problem Relation Age of Onset    Allergies Mother     Cancer Paternal Grandmother         lung    Cancer Paternal Grandfather         lung    No Known Problems Father      Social History     Tobacco Use    Smoking status: Current Some Day Smoker     Packs/day: 0.50     Types: Cigarettes    Smokeless tobacco: Never Used   Substance Use Topics    Alcohol use: Yes     Comment: beer daily      Current Outpatient Medications   Medication Sig Dispense Refill    amoxicillin (AMOXIL) 500 MG capsule Take 1 capsule by mouth 3 times daily for 10 days 30 capsule 0    benzonatate (TESSALON) 200 MG capsule Take 1 capsule by mouth 3 times daily as needed for Cough 30 capsule 0    omeprazole (PRILOSEC) 20 MG delayed release capsule take 1 capsule by mouth once daily 30 capsule 8    tiZANidine (ZANAFLEX) 4 MG tablet Take 1 tablet by mouth 3 times daily as needed (muscle pain) (Patient not taking: Reported on 1/18/2022) 30 tablet 0    PRILOSEC 20 MG delayed release capsule Take 1 capsule by mouth 2 times daily (Patient not taking: Reported on 1/18/2022) 30 capsule 11     No current facility-administered medications for this visit. No Known Allergies  Health Maintenance   Topic Date Due    Hepatitis C screen  Never done    Pneumococcal 0-64 years Vaccine (1 of 2 - PPSV23) Never done    Depression Screen  Never done    HIV screen  Never done    Diabetes screen  Never done    Lipid screen  Never done    COVID-19 Vaccine (2 - Booster for Haleigh series) 11/16/2021    Flu vaccine (1) 01/18/2023 (Originally 9/1/2021)    DTaP/Tdap/Td vaccine (2 - Td or Tdap) 06/23/2027    Hepatitis A vaccine  Aged Out    Hepatitis B vaccine  Aged Out    Hib vaccine  Aged Out    Meningococcal (ACWY) vaccine  Aged Out       Subjective:     Review of Systems   Constitutional: Negative. HENT: Positive for congestion. Eyes: Negative. Respiratory: Positive for cough. Cardiovascular: Negative. Gastrointestinal: Positive for diarrhea. Musculoskeletal: Negative. Skin: Negative. Neurological: Negative. Objective:     Vitals:    01/18/22 1441   BP: 132/80   Site: Left Upper Arm   Position: Sitting   Cuff Size: Large Adult   Pulse: 103   Resp: 18   Temp: 98.9 °F (37.2 °C)   TempSrc: Temporal   SpO2: 95%   Weight: 195 lb 3.2 oz (88.5 kg)       Physical Exam  Constitutional:       Appearance: He is well-developed. HENT:      Head: Normocephalic. Right Ear: Tympanic membrane and external ear normal.      Left Ear: Tympanic membrane and external ear normal.      Nose: Nose normal.   Cardiovascular:      Rate and Rhythm: Normal rate and regular rhythm. Heart sounds: Normal heart sounds. No murmur heard. No friction rub. No gallop. Pulmonary:      Effort: Pulmonary effort is normal.      Breath sounds: Normal breath sounds. No wheezing or rales.    Abdominal: 30 capsule     Refill:  0      See orders  Advised to call back directly if there are further questions, or if these symptoms fail to improve as anticipated or worsen. Patient given educational materials - seepatient instructions. Discussed use, benefit, and side effects of prescribed medications. All patient questions answered. Pt voiced understanding. Patient agreed withtreatment plan. Follow up as directed.      Electronically signed by [facility] CNP on 1/18/2022 at 5:39 PM

## 2023-04-11 ENCOUNTER — APPOINTMENT (OUTPATIENT)
Dept: GENERAL RADIOLOGY | Age: 43
DRG: 643 | End: 2023-04-11
Payer: MEDICAID

## 2023-04-11 ENCOUNTER — HOSPITAL ENCOUNTER (INPATIENT)
Age: 43
LOS: 4 days | Discharge: HOME | DRG: 643 | End: 2023-04-15
Attending: EMERGENCY MEDICINE | Admitting: INTERNAL MEDICINE
Payer: MEDICAID

## 2023-04-11 DIAGNOSIS — E86.0 DEHYDRATION: ICD-10-CM

## 2023-04-11 DIAGNOSIS — E87.1 HYPONATREMIA: Primary | ICD-10-CM

## 2023-04-11 DIAGNOSIS — J18.9 ATYPICAL PNEUMONIA: ICD-10-CM

## 2023-04-11 DIAGNOSIS — R79.89 ELEVATED LIVER FUNCTION TESTS: ICD-10-CM

## 2023-04-11 DIAGNOSIS — E80.6 BILIRUBINEMIA: ICD-10-CM

## 2023-04-11 LAB
ACETONE BLOOD: NEGATIVE
ALBUMIN SERPL BCP-MCNC: 2.6 GM/DL (ref 3.4–5)
ALP SERPL-CCNC: 94 U/L (ref 46–116)
ALT SERPL W P-5'-P-CCNC: 123 U/L (ref 14–63)
AMORPH SED URNS QL MICRO: NORMAL
ANALYZED BY:: NORMAL
ANION GAP SERPL CALC-SCNC: 7 MEQ/L (ref 8–16)
ANION GAP SERPL CALC-SCNC: 8 MEQ/L (ref 8–16)
APAP SERPL-MCNC: < 5 UG/ML (ref 0–20)
AST SERPL W P-5'-P-CCNC: 125 U/L (ref 15–37)
BACTERIA URNS QL MICRO: NORMAL
BILIRUB SERPL-MCNC: 1.9 MG/DL (ref 0.2–1)
BILIRUB UR QL STRIP.AUTO: NORMAL
BUN SERPL-MCNC: 10 MG/DL (ref 7–18)
BUN SERPL-MCNC: 8 MG/DL (ref 7–22)
CA-I BLD ISE-SCNC: 0.95 MMOL/L (ref 1.12–1.32)
CALCIUM SERPL-MCNC: 7.4 MG/DL (ref 8.5–10.5)
CALCIUM SERPL-MCNC: 7.7 MG/DL (ref 8.5–10.1)
CASTS #/AREA URNS LPF: NORMAL /LPF
CHARACTER UR: CLEAR
CHLORIDE SERPL-SCNC: 92 MEQ/L (ref 98–107)
CHLORIDE SERPL-SCNC: 95 MEQ/L (ref 98–111)
CO2 SERPL-SCNC: 21 MEQ/L (ref 23–33)
CO2 SERPL-SCNC: 26 MEQ/L (ref 21–32)
COLOR UR AUTO: NORMAL
CREAT SERPL-MCNC: 0.7 MG/DL (ref 0.4–1.2)
CREAT SERPL-MCNC: 0.9 MG/DL (ref 0.6–1.3)
CRYSTALS VITF MICRO: NORMAL
DATE OF COLLECTION: NORMAL
DIFFERENTIAL, MANUAL: NORMAL
DRAWN BY: NORMAL
EPI CELLS #/AREA URNS HPF: NORMAL /HPF
ETHANOL SERPL-MCNC: < 0.01 % (GM/DL)
GFR SERPL CREATININE-BSD FRML MDRD: > 60 ML/MIN/1.73M2
GFR SERPL CREATININE-BSD FRML MDRD: > 60 ML/MIN/1.73M2
GLUCOSE SERPL-MCNC: 105 MG/DL (ref 70–108)
GLUCOSE SERPL-MCNC: 111 MG/DL (ref 74–106)
GLUCOSE UR QL STRIP.AUTO: NORMAL MG/DL
HGB UR STRIP.AUTO-MCNC: NORMAL MG/L
ICTOTEST: NEGATIVE
INR PPP: 1.44 (ref 0.85–1.13)
KETONES UR QL STRIP.AUTO: NORMAL
LACTATE: 1.4 MMOL/L (ref 0.9–1.7)
LEUKOCYTE ESTERASE UR QL STRIP.AUTO: NORMAL
LIPASE SERPL-CCNC: 112 U/L (ref 73–393)
LIPASE SERPL-CCNC: 42.4 U/L (ref 5.6–51.3)
Lab: 1635
Lab: NORMAL
MONONUCLEOSIS ANTIBODY: NEGATIVE
MUCOUS THREADS URNS QL MICRO: NORMAL
NITRITE UR QL STRIP.AUTO: NORMAL
NT PRO BNP: 154 PG/ML (ref 0–450)
PH BLDV: 7.49 [PH] (ref 7.31–7.41)
PH UR STRIP.AUTO: NORMAL [PH] (ref 5–9)
POTASSIUM SERPL-SCNC: 3.8 MEQ/L (ref 3.5–5.1)
POTASSIUM SERPL-SCNC: 4.1 MEQ/L (ref 3.5–5.2)
PROCALCITONIN SERPL IA-MCNC: 0.6 NG/ML (ref 0.01–0.09)
PROT SERPL-MCNC: 6.6 GM/DL (ref 6.4–8.2)
PROT UR STRIP.AUTO-MCNC: NORMAL MG/DL
RBC #/AREA URNS HPF: NORMAL /HPF
REFLEX TO URINE C & S: NORMAL
SALICYLATES SERPL-MCNC: < 0.3 MG/DL (ref 2–10)
SODIUM SERPL-SCNC: 124 MEQ/L (ref 135–145)
SODIUM SERPL-SCNC: 125 MEQ/L (ref 136–145)
SP GR UR STRIP.AUTO: NORMAL (ref 1–1.03)
UROBILINOGEN UR STRIP-ACNC: NORMAL EU/DL (ref 0–1)
WBC # UR STRIP.AUTO: NORMAL /HPF

## 2023-04-11 PROCEDURE — 81002 URINALYSIS NONAUTO W/O SCOPE: CPT

## 2023-04-11 PROCEDURE — 93005 ELECTROCARDIOGRAM TRACING: CPT | Performed by: NURSE PRACTITIONER

## 2023-04-11 PROCEDURE — 85610 PROTHROMBIN TIME: CPT

## 2023-04-11 PROCEDURE — 82800 BLOOD PH: CPT

## 2023-04-11 PROCEDURE — 86308 HETEROPHILE ANTIBODY SCREEN: CPT

## 2023-04-11 PROCEDURE — 85025 COMPLETE CBC W/AUTO DIFF WBC: CPT

## 2023-04-11 PROCEDURE — 83605 ASSAY OF LACTIC ACID: CPT

## 2023-04-11 PROCEDURE — 36415 COLL VENOUS BLD VENIPUNCTURE: CPT

## 2023-04-11 PROCEDURE — 82533 TOTAL CORTISOL: CPT

## 2023-04-11 PROCEDURE — 83690 ASSAY OF LIPASE: CPT

## 2023-04-11 PROCEDURE — 71046 X-RAY EXAM CHEST 2 VIEWS: CPT

## 2023-04-11 PROCEDURE — 82077 ASSAY SPEC XCP UR&BREATH IA: CPT

## 2023-04-11 PROCEDURE — 87040 BLOOD CULTURE FOR BACTERIA: CPT

## 2023-04-11 PROCEDURE — 96360 HYDRATION IV INFUSION INIT: CPT

## 2023-04-11 PROCEDURE — 84550 ASSAY OF BLOOD/URIC ACID: CPT

## 2023-04-11 PROCEDURE — 80053 COMPREHEN METABOLIC PANEL: CPT

## 2023-04-11 PROCEDURE — 6370000000 HC RX 637 (ALT 250 FOR IP): Performed by: NURSE PRACTITIONER

## 2023-04-11 PROCEDURE — 83880 ASSAY OF NATRIURETIC PEPTIDE: CPT

## 2023-04-11 PROCEDURE — 2580000003 HC RX 258: Performed by: EMERGENCY MEDICINE

## 2023-04-11 PROCEDURE — 80179 DRUG ASSAY SALICYLATE: CPT

## 2023-04-11 PROCEDURE — 1200000003 HC TELEMETRY R&B

## 2023-04-11 PROCEDURE — 83930 ASSAY OF BLOOD OSMOLALITY: CPT

## 2023-04-11 PROCEDURE — 6360000002 HC RX W HCPCS: Performed by: EMERGENCY MEDICINE

## 2023-04-11 PROCEDURE — 99285 EMERGENCY DEPT VISIT HI MDM: CPT

## 2023-04-11 PROCEDURE — 81001 URINALYSIS AUTO W/SCOPE: CPT

## 2023-04-11 PROCEDURE — 2580000003 HC RX 258: Performed by: NURSE PRACTITIONER

## 2023-04-11 PROCEDURE — 93005 ELECTROCARDIOGRAM TRACING: CPT | Performed by: EMERGENCY MEDICINE

## 2023-04-11 PROCEDURE — 84145 PROCALCITONIN (PCT): CPT

## 2023-04-11 PROCEDURE — 80143 DRUG ASSAY ACETAMINOPHEN: CPT

## 2023-04-11 PROCEDURE — 96361 HYDRATE IV INFUSION ADD-ON: CPT

## 2023-04-11 PROCEDURE — 82010 KETONE BODYS QUAN: CPT

## 2023-04-11 PROCEDURE — 82330 ASSAY OF CALCIUM: CPT

## 2023-04-11 RX ORDER — 0.9 % SODIUM CHLORIDE 0.9 %
1000 INTRAVENOUS SOLUTION INTRAVENOUS ONCE
Status: COMPLETED | OUTPATIENT
Start: 2023-04-11 | End: 2023-04-11

## 2023-04-11 RX ORDER — 0.9 % SODIUM CHLORIDE 0.9 %
500 INTRAVENOUS SOLUTION INTRAVENOUS ONCE
Status: COMPLETED | OUTPATIENT
Start: 2023-04-12 | End: 2023-04-12

## 2023-04-11 RX ORDER — ONDANSETRON 4 MG/1
4 TABLET, ORALLY DISINTEGRATING ORAL EVERY 8 HOURS PRN
Status: DISCONTINUED | OUTPATIENT
Start: 2023-04-11 | End: 2023-04-15 | Stop reason: HOSPADM

## 2023-04-11 RX ORDER — ONDANSETRON 2 MG/ML
4 INJECTION INTRAMUSCULAR; INTRAVENOUS ONCE
Status: COMPLETED | OUTPATIENT
Start: 2023-04-11 | End: 2023-04-11

## 2023-04-11 RX ORDER — ACETAMINOPHEN 325 MG/1
650 TABLET ORAL EVERY 6 HOURS PRN
Status: DISCONTINUED | OUTPATIENT
Start: 2023-04-11 | End: 2023-04-12

## 2023-04-11 RX ORDER — SODIUM CHLORIDE 0.9 % (FLUSH) 0.9 %
5-40 SYRINGE (ML) INJECTION EVERY 12 HOURS SCHEDULED
Status: DISCONTINUED | OUTPATIENT
Start: 2023-04-11 | End: 2023-04-12

## 2023-04-11 RX ORDER — ONDANSETRON 2 MG/ML
4 INJECTION INTRAMUSCULAR; INTRAVENOUS EVERY 6 HOURS PRN
Status: DISCONTINUED | OUTPATIENT
Start: 2023-04-11 | End: 2023-04-15 | Stop reason: HOSPADM

## 2023-04-11 RX ORDER — BENZONATATE 100 MG/1
100 CAPSULE ORAL 3 TIMES DAILY PRN
Status: DISCONTINUED | OUTPATIENT
Start: 2023-04-11 | End: 2023-04-12

## 2023-04-11 RX ORDER — IBUPROFEN 200 MG
400 TABLET ORAL EVERY 6 HOURS PRN
Status: ON HOLD | COMMUNITY
End: 2023-04-15 | Stop reason: HOSPADM

## 2023-04-11 RX ORDER — SODIUM CHLORIDE 0.9 % (FLUSH) 0.9 %
5-40 SYRINGE (ML) INJECTION PRN
Status: DISCONTINUED | OUTPATIENT
Start: 2023-04-11 | End: 2023-04-15 | Stop reason: HOSPADM

## 2023-04-11 RX ORDER — ENOXAPARIN SODIUM 100 MG/ML
40 INJECTION SUBCUTANEOUS DAILY
Status: DISCONTINUED | OUTPATIENT
Start: 2023-04-12 | End: 2023-04-15 | Stop reason: HOSPADM

## 2023-04-11 RX ORDER — SODIUM CHLORIDE 9 MG/ML
INJECTION, SOLUTION INTRAVENOUS PRN
Status: DISCONTINUED | OUTPATIENT
Start: 2023-04-11 | End: 2023-04-15 | Stop reason: HOSPADM

## 2023-04-11 RX ORDER — 0.9 % SODIUM CHLORIDE 0.9 %
500 INTRAVENOUS SOLUTION INTRAVENOUS ONCE
Status: DISCONTINUED | OUTPATIENT
Start: 2023-04-12 | End: 2023-04-11

## 2023-04-11 RX ORDER — ACETAMINOPHEN 650 MG/1
650 SUPPOSITORY RECTAL EVERY 6 HOURS PRN
Status: DISCONTINUED | OUTPATIENT
Start: 2023-04-11 | End: 2023-04-12

## 2023-04-11 RX ADMIN — ACETAMINOPHEN 650 MG: 325 TABLET ORAL at 23:43

## 2023-04-11 RX ADMIN — ONDANSETRON 4 MG: 2 INJECTION INTRAMUSCULAR; INTRAVENOUS at 23:42

## 2023-04-11 RX ADMIN — SODIUM CHLORIDE 500 ML: 9 INJECTION, SOLUTION INTRAVENOUS at 23:50

## 2023-04-11 RX ADMIN — SODIUM CHLORIDE 1000 ML: 9 INJECTION, SOLUTION INTRAVENOUS at 16:23

## 2023-04-11 RX ADMIN — SODIUM CHLORIDE 1000 ML: 9 INJECTION, SOLUTION INTRAVENOUS at 15:02

## 2023-04-11 ASSESSMENT — PAIN SCALES - GENERAL: PAINLEVEL_OUTOF10: 0

## 2023-04-11 NOTE — ED NOTES
LAC IV converted to INT, okay to leave INT in per DR. Adams's verbal order. INT wrapped with kerlex roll.       Cristian Olmstead RN  04/11/23 9684

## 2023-04-11 NOTE — ED TRIAGE NOTES
Pt with cough, SOB, fatigued, body aches, intermittent fevers for 2-3 weeks. Diarrhea started yesterday.

## 2023-04-11 NOTE — ED PROVIDER NOTES
motor function, Normal sensory function, No focal deficits noted. Psychiatric:  Affect normal, Judgment normal, Mood normal.     DIAGNOSTIC RESULTS     EKG:     RADIOLOGY:         Interpretation per the Radiologist below, if available at the time of this note:    XR CHEST (2 VW)   Final Result   1. Normal heart size. No effusion. 2. Minimal atelectatic/fibrotic stranding retrocardiac region left lung base and along right hemidiaphragm. **This report has been created using voice recognition software. It may contain minor errors which are inherent in voice recognition technology. **      Final report electronically signed by Dr. Erika Mercado on 4/11/2023 3:39 PM            LABS:  Labs Reviewed   CBC WITH AUTO DIFFERENTIAL - Abnormal; Notable for the following components:       Result Value    RBC 4.26 (*)     Hemoglobin 13.8 (*)     Hematocrit 38.8 (*)     MCH 32.4 (*)     MCHC 35.6 (*)     All other components within normal limits   COMPREHENSIVE METABOLIC PANEL - Abnormal; Notable for the following components:    Glucose 111 (*)     Sodium 125 (*)     Chloride 92 (*)     POC CALCIUM 7.7 (*)      (*)     Albumin 2.6 (*)     Total Bilirubin 1.9 (*)      (*)     All other components within normal limits   ANION GAP - Abnormal; Notable for the following components:    Anion Gap 7.0 (*)     All other components within normal limits   CULTURE, BLOOD 2   CULTURE, BLOOD 1   URINALYSIS WITH REFLEX TO CULTURE   BRAIN NATRIURETIC PEPTIDE   LIPASE   LACTIC ACID   ACETONE   BILIRUBIN, CONFIRMATORY   GLOMERULAR FILTRATION RATE, ESTIMATED   MONONUCLEOSIS SCREEN   MANUAL DIFFERENTIAL   ETHANOL       All other labs were within normal range or not returned as of this dictation.         MIPS  /74   Pulse (!) 111   Temp 99.9 °F (37.7 °C) (Temporal)   Resp 18   Ht 5' 11.5\" (1.816 m)   Wt 198 lb 14.4 oz (90.2 kg)   SpO2 97%   BMI 45.28 kg/m²   Not applicable        EMERGENCY DEPARTMENT COURSE and

## 2023-04-11 NOTE — ED NOTES
EMS offered, pt signed refusal of EMS stating his mother can bring him.       Cristian Olmstead, CARA  04/11/23 2551

## 2023-04-11 NOTE — ED NOTES
Pt is pink, warm and dry, breathing with ease, denies pain. No vomiting, diarrhea, or nausea while here. INT intact to LAC, wrapped in kerlex roll. Pt transported to [address] [facility] [facility] per EMS with mother in stable condition.       Joel Villaseñor RN  04/11/23 9357

## 2023-04-12 ENCOUNTER — APPOINTMENT (OUTPATIENT)
Dept: ULTRASOUND IMAGING | Age: 43
DRG: 643 | End: 2023-04-12
Payer: MEDICAID

## 2023-04-12 ENCOUNTER — APPOINTMENT (OUTPATIENT)
Dept: CT IMAGING | Age: 43
DRG: 643 | End: 2023-04-12
Payer: MEDICAID

## 2023-04-12 PROBLEM — R79.89 ELEVATED LIVER FUNCTION TESTS: Status: ACTIVE | Noted: 2023-04-12

## 2023-04-12 PROBLEM — E83.39 HYPOPHOSPHATEMIA: Status: ACTIVE | Noted: 2023-04-12

## 2023-04-12 PROBLEM — R05.2 SUBACUTE COUGH: Status: ACTIVE | Noted: 2023-04-12

## 2023-04-12 PROBLEM — E87.6 HYPOKALEMIA: Status: ACTIVE | Noted: 2023-04-12

## 2023-04-12 PROBLEM — R74.8 ELEVATED LIVER ENZYMES: Status: ACTIVE | Noted: 2023-04-12

## 2023-04-12 PROBLEM — E87.1 CHRONIC HYPONATREMIA: Status: ACTIVE | Noted: 2023-04-12

## 2023-04-12 PROBLEM — R19.7 DIARRHEA: Status: ACTIVE | Noted: 2023-04-12

## 2023-04-12 LAB
ALBUMIN SERPL BCG-MCNC: 2.9 G/DL (ref 3.5–5.1)
ALP SERPL-CCNC: 82 U/L (ref 38–126)
ALT SERPL W/O P-5'-P-CCNC: 89 U/L (ref 11–66)
AMPHETAMINES UR QL SCN: NEGATIVE
ANION GAP SERPL CALC-SCNC: 10 MEQ/L (ref 8–16)
ANION GAP SERPL CALC-SCNC: 8 MEQ/L (ref 8–16)
ANION GAP SERPL CALC-SCNC: 9 MEQ/L (ref 8–16)
AST SERPL-CCNC: 111 U/L (ref 5–40)
ATYPICAL LYMPHOCYTES: ABNORMAL %
BACTERIA: ABNORMAL
BARBITURATES UR QL SCN: NEGATIVE
BENZODIAZ UR QL SCN: NEGATIVE
BILIRUB CONJ SERPL-MCNC: 1 MG/DL (ref 0–0.3)
BILIRUB SERPL-MCNC: 1.7 MG/DL (ref 0.3–1.2)
BILIRUB UR QL STRIP: ABNORMAL
BUN SERPL-MCNC: 8 MG/DL (ref 7–22)
BZE UR QL SCN: NEGATIVE
CALCIUM SERPL-MCNC: 6.7 MG/DL (ref 8.5–10.5)
CALCIUM SERPL-MCNC: 7.4 MG/DL (ref 8.5–10.5)
CALCIUM SERPL-MCNC: 7.7 MG/DL (ref 8.5–10.5)
CANNABINOIDS UR QL SCN: NEGATIVE
CASTS #/AREA URNS LPF: ABNORMAL /LPF
CASTS #/AREA URNS LPF: ABNORMAL /LPF
CHARACTER UR: CLEAR
CHARCOAL URNS QL MICRO: ABNORMAL
CHLORIDE SERPL-SCNC: 95 MEQ/L (ref 98–111)
CHLORIDE SERPL-SCNC: 98 MEQ/L (ref 98–111)
CHLORIDE SERPL-SCNC: 98 MEQ/L (ref 98–111)
CO2 SERPL-SCNC: 21 MEQ/L (ref 23–33)
CO2 SERPL-SCNC: 21 MEQ/L (ref 23–33)
CO2 SERPL-SCNC: 23 MEQ/L (ref 23–33)
COLOR UR: ABNORMAL
CORTIS SERPL-MCNC: 12.46 UG/DL
CREAT SERPL-MCNC: 0.6 MG/DL (ref 0.4–1.2)
CREAT SERPL-MCNC: 0.6 MG/DL (ref 0.4–1.2)
CREAT SERPL-MCNC: 0.7 MG/DL (ref 0.4–1.2)
CRP SERPL-MCNC: 4.78 MG/DL (ref 0–1)
CRYSTALS URNS QL MICRO: ABNORMAL
DIFFERENTIAL, AUTO: ABNORMAL
EPITHELIAL CELLS, UA: ABNORMAL /HPF
FENTANYL: NEGATIVE
FERRITIN SERPL IA-MCNC: 8669 NG/ML (ref 22–322)
FLUAV RNA RESP QL NAA+PROBE: NOT DETECTED
FLUBV RNA RESP QL NAA+PROBE: NOT DETECTED
GFR SERPL CREATININE-BSD FRML MDRD: > 60 ML/MIN/1.73M2
GGT SERPL-CCNC: 121 U/L (ref 8–69)
GLUCOSE SERPL-MCNC: 101 MG/DL (ref 70–108)
GLUCOSE SERPL-MCNC: 119 MG/DL (ref 70–108)
GLUCOSE SERPL-MCNC: 138 MG/DL (ref 70–108)
GLUCOSE UR QL STRIP.AUTO: NEGATIVE MG/DL
HAV IGM SER QL: NEGATIVE
HBV CORE IGM SERPL QL IA: NEGATIVE
HBV SURFACE AG SERPL QL IA: NEGATIVE
HCT VFR BLD CALC: 38.8 % (ref 42–52)
HCV IGG SERPL QL IA: NEGATIVE
HEMOGLOBIN: 13.8 GM/DL (ref 14–18)
HGB UR QL STRIP.AUTO: NEGATIVE
ICTOTEST: NEGATIVE
KETONES UR QL STRIP.AUTO: ABNORMAL
LDH SERPL L TO P-CCNC: 459 U/L (ref 100–190)
LEUKOCYTE ESTERASE UR QL STRIP.AUTO: ABNORMAL
LV EF: 55 %
LVEF MODALITY: NORMAL
LYMPHOCYTES # BLD AUTO: 32 % (ref 15–47)
MCH RBC QN AUTO: 32.4 PG (ref 26–32)
MCHC RBC AUTO-ENTMCNC: 35.6 GM/DL (ref 31–35)
MCV RBC AUTO: 91.1 FL (ref 80–94)
MONOCYTES: 3 % (ref 0–12)
NITRITE UR QL STRIP.AUTO: POSITIVE
OPIATES UR QL SCN: NEGATIVE
OSMOLALITY SERPL: 257 MOSMOL/KG (ref 275–295)
OSMOLALITY UR: 598 MOSMOL/KG (ref 250–750)
OXYCODONE: NEGATIVE
PATHOLOGIST REVIEW: ABNORMAL
PCP UR QL SCN: NEGATIVE
PDW BLD-RTO: 12.1 % (ref 11.5–14.9)
PH UR STRIP.AUTO: 6 [PH] (ref 5–9)
PHOSPHATE SERPL-MCNC: 2.1 MG/DL (ref 2.4–4.7)
PLATELET # BLD AUTO: 185 THOU/MM3 (ref 130–400)
PLATELET BLD QL SMEAR: ADEQUATE
PMV BLD AUTO: 9.5 FL (ref 9.4–12.4)
POTASSIUM SERPL-SCNC: 3.7 MEQ/L (ref 3.5–5.2)
POTASSIUM SERPL-SCNC: 3.7 MEQ/L (ref 3.5–5.2)
POTASSIUM SERPL-SCNC: 4.3 MEQ/L (ref 3.5–5.2)
PROT SERPL-MCNC: 5.5 G/DL (ref 6.1–8)
PROT UR STRIP.AUTO-MCNC: 30 MG/DL
RBC # BLD: 4.26 MILL/MM3 (ref 4.5–6.1)
RBC #/AREA URNS HPF: ABNORMAL /HPF
RENAL EPI CELLS #/AREA URNS HPF: ABNORMAL /[HPF]
SARS-COV-2 RNA RESP QL NAA+PROBE: NOT DETECTED
SEG NEUTROPHILS: 65 % (ref 43–75)
SODIUM SERPL-SCNC: 124 MEQ/L (ref 135–145)
SODIUM SERPL-SCNC: 128 MEQ/L (ref 135–145)
SODIUM SERPL-SCNC: 129 MEQ/L (ref 135–145)
SODIUM SERPL-SCNC: 130 MEQ/L (ref 135–145)
SODIUM UR-SCNC: 20 MEQ/L
SP GR UR REFRACT.AUTO: 1.02 (ref 1–1.03)
T4 FREE SERPL-MCNC: 1.39 NG/DL (ref 0.93–1.76)
TRIGL SERPL-MCNC: 213 MG/DL (ref 0–199)
URATE SERPL-MCNC: 2.1 MG/DL (ref 3.7–7)
UROBILINOGEN UR QL STRIP.AUTO: >= 8 EU/DL (ref 0–1)
WBC # BLD: 7.7 THOU/MM3 (ref 4.8–10.8)
WBC #/AREA URNS HPF: ABNORMAL /HPF
YEAST LIKE FUNGI URNS QL MICRO: ABNORMAL

## 2023-04-12 PROCEDURE — 87449 NOS EACH ORGANISM AG IA: CPT

## 2023-04-12 PROCEDURE — 86140 C-REACTIVE PROTEIN: CPT

## 2023-04-12 PROCEDURE — 82728 ASSAY OF FERRITIN: CPT

## 2023-04-12 PROCEDURE — 93010 ELECTROCARDIOGRAM REPORT: CPT | Performed by: INTERNAL MEDICINE

## 2023-04-12 PROCEDURE — 82248 BILIRUBIN DIRECT: CPT

## 2023-04-12 PROCEDURE — 2500000003 HC RX 250 WO HCPCS: Performed by: NURSE PRACTITIONER

## 2023-04-12 PROCEDURE — 1200000003 HC TELEMETRY R&B

## 2023-04-12 PROCEDURE — 6370000000 HC RX 637 (ALT 250 FOR IP): Performed by: NURSE PRACTITIONER

## 2023-04-12 PROCEDURE — 99232 SBSQ HOSP IP/OBS MODERATE 35: CPT | Performed by: INTERNAL MEDICINE

## 2023-04-12 PROCEDURE — 84300 ASSAY OF URINE SODIUM: CPT

## 2023-04-12 PROCEDURE — 71250 CT THORAX DX C-: CPT

## 2023-04-12 PROCEDURE — 84478 ASSAY OF TRIGLYCERIDES: CPT

## 2023-04-12 PROCEDURE — 93975 VASCULAR STUDY: CPT

## 2023-04-12 PROCEDURE — 2500000003 HC RX 250 WO HCPCS: Performed by: INTERNAL MEDICINE

## 2023-04-12 PROCEDURE — 80307 DRUG TEST PRSMV CHEM ANLYZR: CPT

## 2023-04-12 PROCEDURE — 87636 SARSCOV2 & INF A&B AMP PRB: CPT

## 2023-04-12 PROCEDURE — 2580000003 HC RX 258: Performed by: INTERNAL MEDICINE

## 2023-04-12 PROCEDURE — 87641 MR-STAPH DNA AMP PROBE: CPT

## 2023-04-12 PROCEDURE — 99222 1ST HOSP IP/OBS MODERATE 55: CPT | Performed by: NURSE PRACTITIONER

## 2023-04-12 PROCEDURE — 86615 BORDETELLA ANTIBODY: CPT

## 2023-04-12 PROCEDURE — 2580000003 HC RX 258: Performed by: NURSE PRACTITIONER

## 2023-04-12 PROCEDURE — 83615 LACTATE (LD) (LDH) ENZYME: CPT

## 2023-04-12 PROCEDURE — 83935 ASSAY OF URINE OSMOLALITY: CPT

## 2023-04-12 PROCEDURE — 6360000002 HC RX W HCPCS: Performed by: INTERNAL MEDICINE

## 2023-04-12 PROCEDURE — 86480 TB TEST CELL IMMUN MEASURE: CPT

## 2023-04-12 PROCEDURE — 82977 ASSAY OF GGT: CPT

## 2023-04-12 PROCEDURE — 84439 ASSAY OF FREE THYROXINE: CPT

## 2023-04-12 PROCEDURE — 80053 COMPREHEN METABOLIC PANEL: CPT

## 2023-04-12 PROCEDURE — 93306 TTE W/DOPPLER COMPLETE: CPT

## 2023-04-12 PROCEDURE — 81001 URINALYSIS AUTO W/SCOPE: CPT

## 2023-04-12 PROCEDURE — 84295 ASSAY OF SERUM SODIUM: CPT

## 2023-04-12 PROCEDURE — 76705 ECHO EXAM OF ABDOMEN: CPT

## 2023-04-12 PROCEDURE — 84100 ASSAY OF PHOSPHORUS: CPT

## 2023-04-12 PROCEDURE — 6370000000 HC RX 637 (ALT 250 FOR IP): Performed by: INTERNAL MEDICINE

## 2023-04-12 PROCEDURE — 80074 ACUTE HEPATITIS PANEL: CPT

## 2023-04-12 PROCEDURE — 36415 COLL VENOUS BLD VENIPUNCTURE: CPT

## 2023-04-12 PROCEDURE — 6360000002 HC RX W HCPCS: Performed by: NURSE PRACTITIONER

## 2023-04-12 RX ORDER — SODIUM CHLORIDE 1000 MG
1 TABLET, SOLUBLE MISCELLANEOUS 2 TIMES DAILY WITH MEALS
Status: DISCONTINUED | OUTPATIENT
Start: 2023-04-12 | End: 2023-04-13

## 2023-04-12 RX ORDER — THIAMINE HYDROCHLORIDE 100 MG/ML
100 INJECTION, SOLUTION INTRAMUSCULAR; INTRAVENOUS DAILY
Status: DISCONTINUED | OUTPATIENT
Start: 2023-04-12 | End: 2023-04-15

## 2023-04-12 RX ORDER — FOLIC ACID 1 MG/1
1 TABLET ORAL DAILY
Status: DISCONTINUED | OUTPATIENT
Start: 2023-04-12 | End: 2023-04-15 | Stop reason: HOSPADM

## 2023-04-12 RX ORDER — MULTIVITAMIN WITH IRON
1 TABLET ORAL DAILY
Status: DISCONTINUED | OUTPATIENT
Start: 2023-04-12 | End: 2023-04-15 | Stop reason: HOSPADM

## 2023-04-12 RX ORDER — NICOTINE 21 MG/24HR
1 PATCH, TRANSDERMAL 24 HOURS TRANSDERMAL DAILY
Status: DISCONTINUED | OUTPATIENT
Start: 2023-04-12 | End: 2023-04-15 | Stop reason: HOSPADM

## 2023-04-12 RX ORDER — MAGNESIUM SULFATE IN WATER 40 MG/ML
2000 INJECTION, SOLUTION INTRAVENOUS PRN
Status: DISCONTINUED | OUTPATIENT
Start: 2023-04-12 | End: 2023-04-13 | Stop reason: SDUPTHER

## 2023-04-12 RX ORDER — BENZONATATE 100 MG/1
200 CAPSULE ORAL 3 TIMES DAILY PRN
Status: DISCONTINUED | OUTPATIENT
Start: 2023-04-12 | End: 2023-04-15 | Stop reason: HOSPADM

## 2023-04-12 RX ORDER — ACETAMINOPHEN 325 MG/1
650 TABLET ORAL EVERY 4 HOURS PRN
Status: DISCONTINUED | OUTPATIENT
Start: 2023-04-12 | End: 2023-04-15 | Stop reason: HOSPADM

## 2023-04-12 RX ADMIN — BENZONATATE 100 MG: 100 CAPSULE ORAL at 02:00

## 2023-04-12 RX ADMIN — FOLIC ACID 1 MG: 1 TABLET ORAL at 09:48

## 2023-04-12 RX ADMIN — ENOXAPARIN SODIUM 40 MG: 100 INJECTION SUBCUTANEOUS at 13:24

## 2023-04-12 RX ADMIN — SODIUM CHLORIDE 1 G: 1 TABLET ORAL at 09:47

## 2023-04-12 RX ADMIN — SODIUM PHOSPHATE, MONOBASIC, MONOHYDRATE AND SODIUM PHOSPHATE, DIBASIC, ANHYDROUS 15 MMOL: 276; 142 INJECTION, SOLUTION INTRAVENOUS at 05:16

## 2023-04-12 RX ADMIN — DOXYCYCLINE 100 MG: 100 INJECTION, POWDER, LYOPHILIZED, FOR SOLUTION INTRAVENOUS at 16:33

## 2023-04-12 RX ADMIN — PHYTONADIONE 5 MG: 10 INJECTION, EMULSION INTRAMUSCULAR; INTRAVENOUS; SUBCUTANEOUS at 13:12

## 2023-04-12 RX ADMIN — BENZONATATE 200 MG: 100 CAPSULE ORAL at 09:49

## 2023-04-12 RX ADMIN — BENZONATATE 200 MG: 100 CAPSULE ORAL at 20:33

## 2023-04-12 RX ADMIN — CALCIUM GLUCONATE 4000 MG: 98 INJECTION, SOLUTION INTRAVENOUS at 14:59

## 2023-04-12 RX ADMIN — ACETAMINOPHEN 650 MG: 325 TABLET ORAL at 20:33

## 2023-04-12 RX ADMIN — Medication 1 TABLET: at 09:47

## 2023-04-12 RX ADMIN — CEFTRIAXONE SODIUM 1000 MG: 1 INJECTION, POWDER, FOR SOLUTION INTRAMUSCULAR; INTRAVENOUS at 20:40

## 2023-04-12 RX ADMIN — THIAMINE HYDROCHLORIDE 100 MG: 100 INJECTION, SOLUTION INTRAMUSCULAR; INTRAVENOUS at 09:46

## 2023-04-12 RX ADMIN — SODIUM CHLORIDE: 9 INJECTION, SOLUTION INTRAVENOUS at 05:14

## 2023-04-12 RX ADMIN — SODIUM CHLORIDE 1 G: 1 TABLET ORAL at 16:51

## 2023-04-12 ASSESSMENT — PAIN SCALES - GENERAL
PAINLEVEL_OUTOF10: 0
PAINLEVEL_OUTOF10: 2
PAINLEVEL_OUTOF10: 2

## 2023-04-12 ASSESSMENT — PAIN DESCRIPTION - ORIENTATION
ORIENTATION: MID
ORIENTATION: MID

## 2023-04-12 ASSESSMENT — PAIN DESCRIPTION - LOCATION
LOCATION: HEAD
LOCATION: HEAD

## 2023-04-12 ASSESSMENT — PAIN DESCRIPTION - DESCRIPTORS: DESCRIPTORS: ACHING

## 2023-04-12 NOTE — FLOWSHEET NOTE
04/12/23 7764   Treatment Team Notification   Reason for Communication Medication concern   Team Member Name [facility]   Treatment Team Role Advanced Practice Nurse   Method of Communication Secure Message   Response See orders   Notification Time [phone]     Patient is complaining of cough

## 2023-04-12 NOTE — CARE COORDINATION
Case Management Assessment  Initial Evaluation    Date/Time of Evaluation: 4/12/2023 1:15 PM  Assessment Completed by: Gerldine Cushing, RN    If patient is discharged prior to next notation, then this note serves as note for discharge by case management. Patient Name: Natty Hardy                   YOB: 1980  Diagnosis: Dehydration [E86.0]  Bilirubinemia [E80.6]  Hyponatremia [E87.1]  Elevated liver function tests [R79.89]                   Date / Time: 4/11/2023  2:19 PM  Location: [address] [place] [address]     Patient Admission Status: Inpatient   Readmission Risk Low 0-14, Mod 15-19), High > 20: Readmission Risk Score: 7.1    Current PCP: JONNA Wiggins CNP  PCP verified by CM? Yes    Chart Reviewed: Yes      History Provided by: Patient  Patient Orientation: Alert and Oriented    Patient Cognition: Alert    Hospitalization in the last 30 days (Readmission):  No    If yes, Readmission Assessment in CM Navigator will be completed. Advance Directives:      Code Status: Full Code   Patient's Primary Decision Maker is: Patient Declined (Legal Next of Kin Remains as Decision Maker)      Discharge Planning:    Patient lives with: Alone, Children Type of Home: House  Primary Care Giver: Self  Patient Support Systems include: Family Members, Children   Current Financial resources: Other (Comment), Financial Counseling (self-pay)  Current community resources: None  Current services prior to admission: None            Current DME:              Type of Home Care services:  None    ADLS  Prior functional level: Independent in ADLs/IADLs  Current functional level: Independent in ADLs/IADLs    Family can provide assistance at DC: Yes  Would you like Case Management to discuss the discharge plan with any other family members/significant others, and if so, who?  No  Plans to Return to Present Housing: Yes  Other Identified Issues/Barriers to RETURNING to current housing: none  Potential Assistance needed at

## 2023-04-12 NOTE — CONSULTS
Subacute cough R05.2    Elevated liver function tests R79.89           Impression and Recommendation:     Atypical pneumonia: procal elevated with CT chest showed small areas of bilateral lower lobe consolidation are present. Multifocal airspace opacities. Pending  Legionella antigen, TB gold, blood culture. Pneumonia panel    Small bilateral pleural effusion: likely 2/2 infection. On RA with O2 Sat 94%  Transaminitis  Diarrhea: ? Likely to above Gallbladder disease? Za Itz No recent abx use. Cont Monitor  Hyponatremia: manage by primary team  Alcohol use disorder: consult in alcohol cessation   Tobacco dependence: consult in tobacco cessation. (-) Urine drug screen including Cannabinoid. Thank you Sorin Dye MD for allowing me to participate in this patient's care. Yeimi Cabrera DO, 4/12/2023 2:24 PM   Patient was seen and examined face-to-face by me  The chart, progress notes, labs and radiographs were reviewed. Appears a community aquired pneumonia ? Viral or bacterial  He is at risk of bacterial pneumonia due to his alcohol use. Single. Will check for respiratory panel ,HIV.  If fever  persist will do CT scan of the abdomen and pelvis

## 2023-04-12 NOTE — SIGNIFICANT EVENT
Noted drop in sodium after fluid bolus. 125-124  Urine osmolality /sodium is pending. Urine specific gravity unable to measure  Serum osmolality is low. Likely-This proves the presence of concentrated urine (>>300 mOsm)  Concerns of hypovolemia/low solute intake. Noted orthostatic hypotension positive  Awaiting on repeat BMP   Concerns if give Albumin sodium would overcorrect. Repeat orthostatic HR and blood pressure and re-evaluate.

## 2023-04-12 NOTE — H&P
HOSPITALIST H+P NOTE      Patient:  Christina Willson    Unit/Bed:6K-19/019-A  YOB: 1980  MRN: [id number]   PCP: JONNA Velazquez CNP  Date of Admission: 4/11/2023  Chief Complaint:- abnormal labs, cough, fever, diarrhea, dehydration    Assessment and Plan:    Hyponatremia, moderate, chronic:  Hypo-osmolar. Positive for history of poor oral intake, diarrhea. History of ETOH daily use however last beer per patient was 2-3 weeks ago. Positive for risk of ODS. No emergent need to correct sodium at time of admission. Symptomatic with complaints of nausea and fatigue. Orthostatic HR and Blood Pressure is pending. No home medications taken to contribute. 4/11/2023 0.9  ml fluid bolus and re-evaluate BMP. Repeat BMP every 6 hours with parameters to call. Monitor for over correction with sodium. Target correction of sodium 4-6 in 24 hours. Mildly elevated liver enzymes:  No jaundice. AST>ALT. Tylenol and Salicylate level is pending. Liver ultrasound is pending. R factor =3.9 with concerns of mixed injury. Hepatitis panel is pending. Non-bloody diarrhea:  likely non inflammatory concerns of infectious source. Positive for fever. Patient denies any new medications, antibiotic exposure or travel. No history of IBS/Crohns. No history of EGD or Colonoscopy. \"Just like water\". Onset per patient 48 hours prior to admission. Lactic acid was normal. Nothing OTC taken prior to hospitalization. Positive for vague complaint of RUQ pain with palpation. History of ETOH however last drink per patient 2-3 weeks ago. GI Panel is pending. COVID testing is pending. Once CDiff has been ruled out can start antidiarrheal if needed. Acute hypophosphatemia:  cover per protocol, likely secondary to depleted phosphate stores, such as alcoholic or malnourished patients.   Anemia, normocytic:  monitor and trend      INITIAL H AND P and HOSPITALIST COURSE:  Yaa Hansen is a 38year old causasian male admitted to [facility] 6K

## 2023-04-12 NOTE — FLOWSHEET NOTE
04/12/23 0629   Treatment Team Notification   Reason for Communication Medication concern   Team Member Name [facility]   Treatment Team Role Advanced Practice Nurse   Method of Communication Secure Message   Response See orders   Notification Time [phone]     Patient is requesting for cough drops

## 2023-04-13 PROBLEM — J18.9 PNEUMONIA OF BOTH LUNGS DUE TO INFECTIOUS ORGANISM: Status: ACTIVE | Noted: 2023-04-13

## 2023-04-13 LAB
ALBUMIN SERPL BCG-MCNC: 2.5 G/DL (ref 3.5–5.1)
ALP SERPL-CCNC: 89 U/L (ref 38–126)
ALT SERPL W/O P-5'-P-CCNC: 84 U/L (ref 11–66)
ANION GAP SERPL CALC-SCNC: 9 MEQ/L (ref 8–16)
AST SERPL-CCNC: 114 U/L (ref 5–40)
BILIRUB SERPL-MCNC: 1.1 MG/DL (ref 0.3–1.2)
BUN SERPL-MCNC: 8 MG/DL (ref 7–22)
CA-I BLD ISE-SCNC: 1.04 MMOL/L (ref 1.12–1.32)
CALCIUM SERPL-MCNC: 7.7 MG/DL (ref 8.5–10.5)
CHLORIDE SERPL-SCNC: 94 MEQ/L (ref 98–111)
CO2 SERPL-SCNC: 21 MEQ/L (ref 23–33)
CREAT SERPL-MCNC: 0.6 MG/DL (ref 0.4–1.2)
FERRITIN SERPL IA-MCNC: 9725 NG/ML (ref 22–322)
GFR SERPL CREATININE-BSD FRML MDRD: > 60 ML/MIN/1.73M2
GLUCOSE SERPL-MCNC: 111 MG/DL (ref 70–108)
HIV 1+2 AB+HIV1 P24 AG SERPL QL IA: NONREACTIVE
INR PPP: 1.3 (ref 0.85–1.13)
L PNEUMO1 AG UR QL IA.RAPID: NEGATIVE
LDH SERPL L TO P-CCNC: 462 U/L (ref 100–190)
MAGNESIUM SERPL-MCNC: 1.7 MG/DL (ref 1.6–2.4)
MRSA DNA SPEC QL NAA+PROBE: NEGATIVE
OSMOLALITY SERPL: 261 MOSMOL/KG (ref 275–295)
PHOSPHATE SERPL-MCNC: 3.1 MG/DL (ref 2.4–4.7)
POTASSIUM SERPL-SCNC: 3.6 MEQ/L (ref 3.5–5.2)
PROT SERPL-MCNC: 5.6 G/DL (ref 6.1–8)
SODIUM SERPL-SCNC: 124 MEQ/L (ref 135–145)
SODIUM SERPL-SCNC: 124 MEQ/L (ref 135–145)
SODIUM SERPL-SCNC: 125 MEQ/L (ref 135–145)
SODIUM SERPL-SCNC: 126 MEQ/L (ref 135–145)

## 2023-04-13 PROCEDURE — 2580000003 HC RX 258: Performed by: INTERNAL MEDICINE

## 2023-04-13 PROCEDURE — 84100 ASSAY OF PHOSPHORUS: CPT

## 2023-04-13 PROCEDURE — 2500000003 HC RX 250 WO HCPCS: Performed by: INTERNAL MEDICINE

## 2023-04-13 PROCEDURE — 36415 COLL VENOUS BLD VENIPUNCTURE: CPT

## 2023-04-13 PROCEDURE — 80053 COMPREHEN METABOLIC PANEL: CPT

## 2023-04-13 PROCEDURE — 2580000003 HC RX 258: Performed by: NURSE PRACTITIONER

## 2023-04-13 PROCEDURE — 6360000002 HC RX W HCPCS: Performed by: NURSE PRACTITIONER

## 2023-04-13 PROCEDURE — 82330 ASSAY OF CALCIUM: CPT

## 2023-04-13 PROCEDURE — 6370000000 HC RX 637 (ALT 250 FOR IP): Performed by: INTERNAL MEDICINE

## 2023-04-13 PROCEDURE — 84295 ASSAY OF SERUM SODIUM: CPT

## 2023-04-13 PROCEDURE — 83930 ASSAY OF BLOOD OSMOLALITY: CPT

## 2023-04-13 PROCEDURE — 83615 LACTATE (LD) (LDH) ENZYME: CPT

## 2023-04-13 PROCEDURE — 86038 ANTINUCLEAR ANTIBODIES: CPT

## 2023-04-13 PROCEDURE — 6360000002 HC RX W HCPCS: Performed by: INTERNAL MEDICINE

## 2023-04-13 PROCEDURE — 85610 PROTHROMBIN TIME: CPT

## 2023-04-13 PROCEDURE — 86255 FLUORESCENT ANTIBODY SCREEN: CPT

## 2023-04-13 PROCEDURE — 82728 ASSAY OF FERRITIN: CPT

## 2023-04-13 PROCEDURE — 1200000003 HC TELEMETRY R&B

## 2023-04-13 PROCEDURE — 87389 HIV-1 AG W/HIV-1&-2 AB AG IA: CPT

## 2023-04-13 PROCEDURE — 83735 ASSAY OF MAGNESIUM: CPT

## 2023-04-13 PROCEDURE — 99233 SBSQ HOSP IP/OBS HIGH 50: CPT | Performed by: INTERNAL MEDICINE

## 2023-04-13 RX ORDER — SODIUM CHLORIDE 1000 MG
1 TABLET, SOLUBLE MISCELLANEOUS
Status: DISCONTINUED | OUTPATIENT
Start: 2023-04-13 | End: 2023-04-15

## 2023-04-13 RX ORDER — MAGNESIUM SULFATE IN WATER 40 MG/ML
2000 INJECTION, SOLUTION INTRAVENOUS PRN
Status: DISCONTINUED | OUTPATIENT
Start: 2023-04-13 | End: 2023-04-15 | Stop reason: HOSPADM

## 2023-04-13 RX ORDER — CALCIUM GLUCONATE 20 MG/ML
2000 INJECTION, SOLUTION INTRAVENOUS ONCE
Status: COMPLETED | OUTPATIENT
Start: 2023-04-13 | End: 2023-04-13

## 2023-04-13 RX ADMIN — BENZONATATE 200 MG: 100 CAPSULE ORAL at 10:43

## 2023-04-13 RX ADMIN — FOLIC ACID 1 MG: 1 TABLET ORAL at 10:46

## 2023-04-13 RX ADMIN — SODIUM CHLORIDE 1 G: 1 TABLET ORAL at 18:43

## 2023-04-13 RX ADMIN — DOXYCYCLINE 100 MG: 100 INJECTION, POWDER, LYOPHILIZED, FOR SOLUTION INTRAVENOUS at 04:19

## 2023-04-13 RX ADMIN — CALCIUM GLUCONATE 2000 MG: 20 INJECTION, SOLUTION INTRAVENOUS at 07:15

## 2023-04-13 RX ADMIN — DOXYCYCLINE 100 MG: 100 INJECTION, POWDER, LYOPHILIZED, FOR SOLUTION INTRAVENOUS at 16:00

## 2023-04-13 RX ADMIN — SODIUM CHLORIDE 1 G: 1 TABLET ORAL at 14:00

## 2023-04-13 RX ADMIN — Medication 30 G: at 15:52

## 2023-04-13 RX ADMIN — ENOXAPARIN SODIUM 40 MG: 100 INJECTION SUBCUTANEOUS at 10:43

## 2023-04-13 RX ADMIN — THIAMINE HYDROCHLORIDE 100 MG: 100 INJECTION, SOLUTION INTRAMUSCULAR; INTRAVENOUS at 10:44

## 2023-04-13 RX ADMIN — CEFTRIAXONE SODIUM 1000 MG: 1 INJECTION, POWDER, FOR SOLUTION INTRAMUSCULAR; INTRAVENOUS at 20:34

## 2023-04-13 RX ADMIN — SODIUM CHLORIDE: 9 INJECTION, SOLUTION INTRAVENOUS at 04:18

## 2023-04-13 RX ADMIN — BENZONATATE 200 MG: 100 CAPSULE ORAL at 04:21

## 2023-04-13 RX ADMIN — SODIUM CHLORIDE 1 G: 1 TABLET ORAL at 10:45

## 2023-04-13 RX ADMIN — Medication 1 TABLET: at 10:46

## 2023-04-13 ASSESSMENT — PAIN SCALES - GENERAL
PAINLEVEL_OUTOF10: 0
PAINLEVEL_OUTOF10: 0
PAINLEVEL_OUTOF10: 2

## 2023-04-14 ENCOUNTER — APPOINTMENT (OUTPATIENT)
Dept: CT IMAGING | Age: 43
DRG: 643 | End: 2023-04-14
Payer: MEDICAID

## 2023-04-14 ENCOUNTER — APPOINTMENT (OUTPATIENT)
Dept: GENERAL RADIOLOGY | Age: 43
DRG: 643 | End: 2023-04-14
Payer: MEDICAID

## 2023-04-14 LAB
ALBUMIN SERPL BCG-MCNC: 2.5 G/DL (ref 3.5–5.1)
ALP SERPL-CCNC: 88 U/L (ref 38–126)
ALT SERPL W/O P-5'-P-CCNC: 79 U/L (ref 11–66)
ANION GAP SERPL CALC-SCNC: 10 MEQ/L (ref 8–16)
ANION GAP SERPL CALC-SCNC: 9 MEQ/L (ref 8–16)
AST SERPL-CCNC: 111 U/L (ref 5–40)
B PERT DNA NPH QL NAA+PROBE: NOT DETECTED
BASOPHILS ABSOLUTE: 0.1 THOU/MM3 (ref 0–0.1)
BASOPHILS NFR BLD AUTO: 0.9 %
BILIRUB SERPL-MCNC: 0.8 MG/DL (ref 0.3–1.2)
BORDETELLA PARAPERTUSSIS BY PCR: NOT DETECTED
BORDETELLA PERTUSSIS AB: NORMAL
BUN SERPL-MCNC: 10 MG/DL (ref 7–22)
BUN SERPL-MCNC: 9 MG/DL (ref 7–22)
C PNEUM DNA SPEC QL NAA+PROBE: NOT DETECTED
CALCIUM SERPL-MCNC: 7.3 MG/DL (ref 8.5–10.5)
CALCIUM SERPL-MCNC: 7.4 MG/DL (ref 8.5–10.5)
CHLORIDE SERPL-SCNC: 95 MEQ/L (ref 98–111)
CHLORIDE SERPL-SCNC: 95 MEQ/L (ref 98–111)
CO2 SERPL-SCNC: 20 MEQ/L (ref 23–33)
CO2 SERPL-SCNC: 20 MEQ/L (ref 23–33)
CORTIS SERPL-MCNC: 11.62 UG/DL
CORTISOL COLLECTION INFO: NORMAL
CREAT SERPL-MCNC: 0.5 MG/DL (ref 0.4–1.2)
CREAT SERPL-MCNC: 0.6 MG/DL (ref 0.4–1.2)
DEPRECATED RDW RBC AUTO: 46.2 FL (ref 35–45)
EOSINOPHIL NFR BLD AUTO: 0.4 %
EOSINOPHILS ABSOLUTE: 0 THOU/MM3 (ref 0–0.4)
ERYTHROCYTE [DISTWIDTH] IN BLOOD BY AUTOMATED COUNT: 13 % (ref 11.5–14.5)
FERRITIN SERPL IA-MCNC: 8947 NG/ML (ref 22–322)
FLUAV RNA NPH QL NAA+PROBE: NOT DETECTED
FLUBV RNA NPH QL NAA+PROBE: NOT DETECTED
GFR SERPL CREATININE-BSD FRML MDRD: > 60 ML/MIN/1.73M2
GFR SERPL CREATININE-BSD FRML MDRD: > 60 ML/MIN/1.73M2
GLUCOSE SERPL-MCNC: 108 MG/DL (ref 70–108)
GLUCOSE SERPL-MCNC: 98 MG/DL (ref 70–108)
HADV DNA NPH QL NAA+PROBE: NOT DETECTED
HCOV 229E RNA SPEC QL NAA+PROBE: NOT DETECTED
HCOV HKU1 RNA SPEC QL NAA+PROBE: NOT DETECTED
HCOV NL63 RNA SPEC QL NAA+PROBE: NOT DETECTED
HCOV OC43 RNA SPEC QL NAA+PROBE: NOT DETECTED
HCT VFR BLD AUTO: 34.8 % (ref 42–52)
HGB BLD-MCNC: 11.6 GM/DL (ref 14–18)
HMPV RNA NPH QL NAA+PROBE: NOT DETECTED
HPIV1 RNA NPH QL NAA+PROBE: NOT DETECTED
HPIV2 RNA NPH QL NAA+PROBE: NOT DETECTED
HPIV3 RNA NPH QL NAA+PROBE: NOT DETECTED
HPIV4 RNA NPH QL NAA+PROBE: NOT DETECTED
IMM GRANULOCYTES # BLD AUTO: 0.07 THOU/MM3 (ref 0–0.07)
IMM GRANULOCYTES NFR BLD AUTO: 0.9 %
IRON SATN MFR SERPL: 20 % (ref 20–50)
IRON SERPL-MCNC: 32 UG/DL (ref 65–195)
LYMPHOCYTES ABSOLUTE: 3.4 THOU/MM3 (ref 1–4.8)
LYMPHOCYTES NFR BLD AUTO: 44.9 %
M PNEUMO DNA SPEC QL NAA+PROBE: NOT DETECTED
MAGNESIUM SERPL-MCNC: 1.7 MG/DL (ref 1.6–2.4)
MCH RBC QN AUTO: 32 PG (ref 26–33)
MCHC RBC AUTO-ENTMCNC: 33.3 GM/DL (ref 32.2–35.5)
MCV RBC AUTO: 96.1 FL (ref 80–94)
MONOCYTES ABSOLUTE: 0.5 THOU/MM3 (ref 0.4–1.3)
MONOCYTES NFR BLD AUTO: 6.5 %
NEUTROPHILS NFR BLD AUTO: 46.4 %
NRBC BLD AUTO-RTO: 0 /100 WBC
OSMOLALITY SERPL: 259 MOSMOL/KG (ref 275–295)
OSMOLALITY UR: 756 MOSMOL/KG (ref 250–750)
PHOSPHATE SERPL-MCNC: 3.5 MG/DL (ref 2.4–4.7)
PLATELET # BLD AUTO: 215 THOU/MM3 (ref 130–400)
PMV BLD AUTO: 10.7 FL (ref 9.4–12.4)
POTASSIUM SERPL-SCNC: 3.7 MEQ/L (ref 3.5–5.2)
POTASSIUM SERPL-SCNC: 3.7 MEQ/L (ref 3.5–5.2)
PROCALCITONIN SERPL IA-MCNC: 0.57 NG/ML (ref 0.01–0.09)
PROT SERPL-MCNC: 5.1 G/DL (ref 6.1–8)
RBC # BLD AUTO: 3.62 MILL/MM3 (ref 4.7–6.1)
RSV RNA NPH QL NAA+PROBE: NOT DETECTED
RV+EV RNA SPEC QL NAA+PROBE: NOT DETECTED
SARS-COV-2 RNA NPH QL NAA+NON-PROBE: NOT DETECTED
SEGMENTED NEUTROPHILS ABSOLUTE COUNT: 3.5 THOU/MM3 (ref 1.8–7.7)
SODIUM SERPL-SCNC: 123 MEQ/L (ref 135–145)
SODIUM SERPL-SCNC: 124 MEQ/L (ref 135–145)
SODIUM SERPL-SCNC: 125 MEQ/L (ref 135–145)
SODIUM SERPL-SCNC: 125 MEQ/L (ref 135–145)
SODIUM SERPL-SCNC: 127 MEQ/L (ref 135–145)
SODIUM SERPL-SCNC: 127 MEQ/L (ref 135–145)
SODIUM UR-SCNC: 77 MEQ/L
STREP PNEUMO AG, UR: NEGATIVE
TIBC SERPL-MCNC: 158 UG/DL (ref 171–450)
WBC # BLD AUTO: 7.6 THOU/MM3 (ref 4.8–10.8)

## 2023-04-14 PROCEDURE — 83930 ASSAY OF BLOOD OSMOLALITY: CPT

## 2023-04-14 PROCEDURE — 84295 ASSAY OF SERUM SODIUM: CPT

## 2023-04-14 PROCEDURE — 84300 ASSAY OF URINE SODIUM: CPT

## 2023-04-14 PROCEDURE — 2580000003 HC RX 258: Performed by: INTERNAL MEDICINE

## 2023-04-14 PROCEDURE — 80053 COMPREHEN METABOLIC PANEL: CPT

## 2023-04-14 PROCEDURE — 36415 COLL VENOUS BLD VENIPUNCTURE: CPT

## 2023-04-14 PROCEDURE — 86698 HISTOPLASMA ANTIBODY: CPT

## 2023-04-14 PROCEDURE — 87899 AGENT NOS ASSAY W/OPTIC: CPT

## 2023-04-14 PROCEDURE — 85025 COMPLETE CBC W/AUTO DIFF WBC: CPT

## 2023-04-14 PROCEDURE — 84100 ASSAY OF PHOSPHORUS: CPT

## 2023-04-14 PROCEDURE — 83540 ASSAY OF IRON: CPT

## 2023-04-14 PROCEDURE — 6360000002 HC RX W HCPCS: Performed by: INTERNAL MEDICINE

## 2023-04-14 PROCEDURE — 2500000003 HC RX 250 WO HCPCS: Performed by: INTERNAL MEDICINE

## 2023-04-14 PROCEDURE — 87385 HISTOPLASMA CAPSUL AG IA: CPT

## 2023-04-14 PROCEDURE — 71045 X-RAY EXAM CHEST 1 VIEW: CPT

## 2023-04-14 PROCEDURE — 83935 ASSAY OF URINE OSMOLALITY: CPT

## 2023-04-14 PROCEDURE — 6370000000 HC RX 637 (ALT 250 FOR IP): Performed by: INTERNAL MEDICINE

## 2023-04-14 PROCEDURE — C9113 INJ PANTOPRAZOLE SODIUM, VIA: HCPCS | Performed by: INTERNAL MEDICINE

## 2023-04-14 PROCEDURE — 86635 COCCIDIOIDES ANTIBODY: CPT

## 2023-04-14 PROCEDURE — 84145 PROCALCITONIN (PCT): CPT

## 2023-04-14 PROCEDURE — 83550 IRON BINDING TEST: CPT

## 2023-04-14 PROCEDURE — 86645 CMV ANTIBODY IGM: CPT

## 2023-04-14 PROCEDURE — 0202U NFCT DS 22 TRGT SARS-COV-2: CPT

## 2023-04-14 PROCEDURE — 1200000003 HC TELEMETRY R&B

## 2023-04-14 PROCEDURE — 99233 SBSQ HOSP IP/OBS HIGH 50: CPT | Performed by: INTERNAL MEDICINE

## 2023-04-14 PROCEDURE — 86612 BLASTOMYCES ANTIBODY: CPT

## 2023-04-14 PROCEDURE — 82533 TOTAL CORTISOL: CPT

## 2023-04-14 PROCEDURE — 86606 ASPERGILLUS ANTIBODY: CPT

## 2023-04-14 PROCEDURE — 82728 ASSAY OF FERRITIN: CPT

## 2023-04-14 PROCEDURE — 83735 ASSAY OF MAGNESIUM: CPT

## 2023-04-14 PROCEDURE — 74176 CT ABD & PELVIS W/O CONTRAST: CPT

## 2023-04-14 PROCEDURE — 6360000002 HC RX W HCPCS: Performed by: NURSE PRACTITIONER

## 2023-04-14 RX ORDER — PANTOPRAZOLE SODIUM 40 MG/10ML
40 INJECTION, POWDER, LYOPHILIZED, FOR SOLUTION INTRAVENOUS ONCE
Status: COMPLETED | OUTPATIENT
Start: 2023-04-14 | End: 2023-04-14

## 2023-04-14 RX ORDER — CALCIUM CARBONATE 200(500)MG
500 TABLET,CHEWABLE ORAL 3 TIMES DAILY PRN
Status: DISCONTINUED | OUTPATIENT
Start: 2023-04-14 | End: 2023-04-15 | Stop reason: HOSPADM

## 2023-04-14 RX ORDER — PANTOPRAZOLE SODIUM 40 MG/1
40 TABLET, DELAYED RELEASE ORAL
Status: DISCONTINUED | OUTPATIENT
Start: 2023-04-15 | End: 2023-04-15 | Stop reason: HOSPADM

## 2023-04-14 RX ORDER — 0.9 % SODIUM CHLORIDE 0.9 %
500 INTRAVENOUS SOLUTION INTRAVENOUS ONCE
Status: COMPLETED | OUTPATIENT
Start: 2023-04-14 | End: 2023-04-14

## 2023-04-14 RX ORDER — SODIUM CHLORIDE 9 MG/ML
INJECTION, SOLUTION INTRAVENOUS CONTINUOUS
Status: DISCONTINUED | OUTPATIENT
Start: 2023-04-14 | End: 2023-04-15

## 2023-04-14 RX ADMIN — DOXYCYCLINE 100 MG: 100 INJECTION, POWDER, LYOPHILIZED, FOR SOLUTION INTRAVENOUS at 15:49

## 2023-04-14 RX ADMIN — Medication 1 TABLET: at 09:49

## 2023-04-14 RX ADMIN — ACETAMINOPHEN 650 MG: 325 TABLET ORAL at 16:02

## 2023-04-14 RX ADMIN — DOXYCYCLINE 100 MG: 100 INJECTION, POWDER, LYOPHILIZED, FOR SOLUTION INTRAVENOUS at 04:07

## 2023-04-14 RX ADMIN — CEFTRIAXONE SODIUM 1000 MG: 1 INJECTION, POWDER, FOR SOLUTION INTRAMUSCULAR; INTRAVENOUS at 20:30

## 2023-04-14 RX ADMIN — SODIUM CHLORIDE 500 ML: 9 INJECTION, SOLUTION INTRAVENOUS at 09:56

## 2023-04-14 RX ADMIN — SODIUM CHLORIDE: 9 INJECTION, SOLUTION INTRAVENOUS at 15:48

## 2023-04-14 RX ADMIN — SODIUM CHLORIDE 1 G: 1 TABLET ORAL at 12:28

## 2023-04-14 RX ADMIN — ENOXAPARIN SODIUM 40 MG: 100 INJECTION SUBCUTANEOUS at 09:49

## 2023-04-14 RX ADMIN — SODIUM CHLORIDE 1 G: 1 TABLET ORAL at 15:47

## 2023-04-14 RX ADMIN — FOLIC ACID 1 MG: 1 TABLET ORAL at 09:49

## 2023-04-14 RX ADMIN — PANTOPRAZOLE SODIUM 40 MG: 40 INJECTION, POWDER, FOR SOLUTION INTRAVENOUS at 09:49

## 2023-04-14 RX ADMIN — SODIUM CHLORIDE 1 G: 1 TABLET ORAL at 09:49

## 2023-04-14 RX ADMIN — THIAMINE HYDROCHLORIDE 100 MG: 100 INJECTION, SOLUTION INTRAMUSCULAR; INTRAVENOUS at 09:49

## 2023-04-14 ASSESSMENT — PAIN SCALES - GENERAL
PAINLEVEL_OUTOF10: 6
PAINLEVEL_OUTOF10: 0
PAINLEVEL_OUTOF10: 0

## 2023-04-14 ASSESSMENT — PAIN DESCRIPTION - ORIENTATION: ORIENTATION: MID

## 2023-04-14 ASSESSMENT — PAIN DESCRIPTION - LOCATION: LOCATION: BACK

## 2023-04-14 ASSESSMENT — PAIN DESCRIPTION - DESCRIPTORS: DESCRIPTORS: TIGHTNESS;DISCOMFORT

## 2023-04-14 NOTE — CARE COORDINATION
4/14/23, 2:32 PM EDT        Ca+ 7.3, PCT 0.57, ALT 79, . Tmax 101.4. Remains on IV rocephin, IV doxycycline, lyte replacement. ID following. Possible weekend discharge. Patient goals/plan/ treatment preferences discussed by  and . Patient goals/plan/ treatment preferences reviewed with patient/ family. Patient/ family verbalize understanding of discharge plan and are in agreement with goal/plan/treatment preferences. Understanding was demonstrated using the teach back method. AVS provided by RN at time of discharge, which includes all necessary medical information pertaining to the patients current course of illness, treatment, post-discharge goals of care, and treatment preferences.      Services At/After Discharge: None

## 2023-04-14 NOTE — PROGRESS NOTES
Assessment and Plan:        Cough x 3wks:  cxr nonrevealing; CT shows patchy infiltrates; the AP view particularly odd. Rx as pneumonia. Chills,sweats; suspect infectious source; blood cult pending, echo ok; consider CT abdomen  Hyponatremia- urine chems suggestive of SIADH but may be tea and toast syndrome; poor intake, drinks 6-8 bottles water per day; fluid restrict, follow sodium, salt tabs; he is a heavy water drinker. Elev lfts, suspect due to etoh intake; monitor; US shows fatty liver. Low phos, calcium- suspect due to etoh, malnutrition; replace. Diarrhea - resolved; do not suspect c diff      CC:   cough x 3 wk, chills, feels poorly anorexia and nausea  HPI: pt with chronic etoh use, stopped 3 wks ago when developed persistent np cough, feeling poorly anorexia; poor intake of solids but drank 6-8 bottles of water per day; finally came to ER. Hyponatremic, low po4, calcium; high procal.  HIGH FERRITIN AND LDH  ROS (12 point review of systems completed. Pertinent positives noted.  Otherwise ROS is negative) :  hx hbp    PMH:  Per HPI  SHX:  lives alone , smoker, no hx IVDU, no pets, birds  FHX: cancer grandparents  Allergies: See above    Medications:     sodium chloride 5 mL/hr at 04/13/23 0418      phosphorus replacement protocol   Other RX Placeholder    magnesium replacement protocol   Other RX Placeholder    calcium replacement protocol   Other RX Placeholder    thiamine  100 mg IntraVENous Daily    folic acid  1 mg Oral Daily    multivitamin  1 tablet Oral Daily    sodium chloride  1 g Oral BID WC    nicotine  1 patch TransDERmal Daily    cefTRIAXone (ROCEPHIN) IV  1,000 mg IntraVENous Q24H    doxycycline (VIBRAMYCIN) IV  100 mg IntraVENous Q12H    enoxaparin  40 mg SubCUTAneous Daily       Vital Signs:   /64   Pulse (!) 110   Temp 99.4 °F (37.4 °C) (Oral)   Resp 18   Ht 5' 11.5\" (1.816 m)   Wt 210 lb 5.1 oz (95.4 kg)   SpO2 95%   BMI 28.92 kg/m²      Intake/Output Summary
Comprehensive Nutrition Assessment    Type and Reason for Visit:  Initial, Positive Nutrition Screen, Consult (poor oral intake, unplanned weight loss)    Nutrition Recommendations/Plan:   Consider anti-reflux medication if/when appropriate, reports chronic GERD as part of poor oral intake. Anti-reflux measures reviewed. Continue MVI, thiamin, and folvite. ONS: 4 oz. Ensure Compact, encouraged patient to drink in between meals to mimic small frequent meals. Continue current diet. Reviewed 1200 ml fluid restriction with patient. Malnutrition Assessment:  Malnutrition Status: At risk for malnutrition (Comment) (04/12/23 7268)    Context:  Chronic Illness     Findings of the 6 clinical characteristics of malnutrition:  Energy Intake:  75% or less estimated energy requirements for 1 month or longer  Weight Loss:  No significant weight loss     Body Fat Loss:  No significant body fat loss     Muscle Mass Loss:  No significant muscle mass loss    Fluid Accumulation:  No significant fluid accumulation     Strength:  Not Performed    Nutrition Assessment:     Pt. nutritionally compromised AEB very poor oral intake for months. At risk for further nutrition compromise r/t altered GI function-GERD/diarrhea, admit with dehydration, hyponatremia, elevated liver function tests, daily alcohol use, and underlying medical condition (hx: HTN,GERD, anxiety, smoking, daily alcohol use-6 beer/night). Nutrition Related Findings:    Pt. Report/Treatments/Miscellaneous: Patient seen, reports he hasn't ate well for months, decreased appetite, \"afraid to eat d/t reflux\", and recently has had cough, chills, sweats, and diarrhea. States he has had GERD since the 8th grade, states he doesn't like to take medication daily so was taking prilosec as needed, noting increased frequency prior to admit. Hasn't tried Ensure Compact yet but will.   Reports large fluid intake prior to admit, further explained current fluid
Comprehensive Nutrition Assessment    Type and Reason for Visit:  Reassess (po/supplement)    Nutrition Recommendations/Plan:   Consider anti-reflux medication if/when appropriate, reports chronic GERD as part of poor oral intake. Anti-reflux measures reviewed. Update-protonix ordered today. Continue MVI, thiamin, and folvite. ONS: 4 oz. Ensure Compact, encouraged patient to drink in between meals to mimic small frequent meals. Reassured patient today, they are lactose free, he was nervous to drink d/t current GI issues. Continue current diet. Reviewed 1200 ml fluid restriction with patient     Malnutrition Assessment:  Malnutrition Status: At risk for malnutrition (Comment) (04/12/23 0449)    Context:  Chronic Illness     Findings of the 6 clinical characteristics of malnutrition:  Energy Intake:  75% or less estimated energy requirements for 1 month or longer  Weight Loss:  No significant weight loss     Body Fat Loss:  No significant body fat loss     Muscle Mass Loss:  No significant muscle mass loss    Fluid Accumulation:  No significant fluid accumulation     Strength:  Not Performed    Nutrition Assessment:     Pt. remains nutritionally compromised AEB continued very poor oral intake, altered GI function-GERD/diarrhea, admit with dehydration, hyponatremia SIADH, elevated liver function tests, daily alcohol use, atypical pneumonia, and underlying medical condition (hx: HTN,GERD, anxiety, smoking, daily alcohol use-6 beer/night). Nutrition Related Findings:    Pt. Report/Treatments/Miscellaneous: Patient seen earlier this morning, breakfast tray at bedside, states he has been only drinking water, his reflux is so bad he can't eat and diarrhea has returned. Spoke with RN, she will reach out to Provider to see if anti-reflux med can be ordered.   In collaborative huddle-discussion Provider plan to r/o Covid-19  GI Status: reflux and diarrhea reported, last documented BM 4/13/23  Pertinent Labs:
Echo completed at bedside. Echo ROW Dr. Sadiq Lockhart to read.
Progress note: Infectious diseases    Patient - Billy Mendez,  Age - 38 y.o.    - 1980      Room Number - 8R-68/361-N   MRN -  [id number]   Acct # - [de-identified]  Date of Admission -  2023  2:19 PM    SUBJECTIVE:   He has on and off fever  Dry mouth  OBJECTIVE   VITALS    height is 5' 11.5\" (1.816 m) and weight is 210 lb 8.6 oz (95.5 kg). His oral temperature is 98.7 °F (37.1 °C). His blood pressure is 115/65 and his pulse is 95. His respiration is 16 and oxygen saturation is 93%.        Wt Readings from Last 3 Encounters:   23 210 lb 8.6 oz (95.5 kg)   22 195 lb 3.2 oz (88.5 kg)   21 186 lb 9.6 oz (84.6 kg)       I/O (24 Hours)    Intake/Output Summary (Last 24 hours) at 2023 1002  Last data filed at 2023 2343  Gross per 24 hour   Intake 1240 ml   Output --   Net 1240 ml         General Appearance  Awake, alert, oriented,  not  In acute distress  HEENT - normocephalic, atraumatic, pink conjunctiva,  anicteric sclera  Neck - Supple, no mass  Lungs -  Bilateral good air entry, no rhonchi, no wheeze  Cardiovascular - Heart sounds are normal.    Abdomen - soft, not distended, nontender,   Neurologic -oriented  Skin - No bruising or bleeding  Extremities - No edema, chronic stasis changes    MEDICATIONS:      sodium chloride  500 mL IntraVENous Once    [START ON 4/15/2023] pantoprazole  40 mg Oral QAM AC    [Held by provider] urea  30 g Oral Daily    sodium chloride  1 g Oral TID WC    calcium replacement protocol   Other RX Placeholder    thiamine  100 mg IntraVENous Daily    folic acid  1 mg Oral Daily    multivitamin  1 tablet Oral Daily    nicotine  1 patch TransDERmal Daily    cefTRIAXone (ROCEPHIN) IV  1,000 mg IntraVENous Q24H    doxycycline (VIBRAMYCIN) IV  100 mg IntraVENous Q12H    enoxaparin  40 mg SubCUTAneous Daily      sodium chloride 5 mL/hr at 23 0418     calcium
Progress note: Infectious diseases    Patient - Daljit Wesley,  Age - 38 y.o.    - 1980      Room Number - 5S-53/958-F   MRN -  [id number]   Acct # - [de-identified]  Date of Admission -  2023  2:19 PM    SUBJECTIVE:   He feels better, wants to drink more water  Fever is better, no chest pain  OBJECTIVE   VITALS    height is 5' 11.5\" (1.816 m) and weight is 210 lb 5.1 oz (95.4 kg). His oral temperature is 98.5 °F (36.9 °C). His blood pressure is 117/66 and his pulse is 96. His respiration is 16 and oxygen saturation is 95%.        Wt Readings from Last 3 Encounters:   23 210 lb 5.1 oz (95.4 kg)   22 195 lb 3.2 oz (88.5 kg)   21 186 lb 9.6 oz (84.6 kg)       I/O (24 Hours)    Intake/Output Summary (Last 24 hours) at 2023 1045  Last data filed at 2023 1930  Gross per 24 hour   Intake 240 ml   Output 0 ml   Net 240 ml       General Appearance  Awake, alert, oriented,  not  In acute distress  HEENT - normocephalic, atraumatic, pink conjunctiva,  anicteric sclera  Neck - Supple, no mass  Lungs -  Bilateral good air entry, no rhonchi, no wheeze  Cardiovascular - Heart sounds are normal.    Abdomen - soft, not distended, nontender,   Neurologic -oriented  Skin - No bruising or bleeding  Extremities - No edema, chronic stasis changes    MEDICATIONS:      calcium replacement protocol   Other RX Placeholder    thiamine  100 mg IntraVENous Daily    folic acid  1 mg Oral Daily    multivitamin  1 tablet Oral Daily    sodium chloride  1 g Oral BID WC    nicotine  1 patch TransDERmal Daily    cefTRIAXone (ROCEPHIN) IV  1,000 mg IntraVENous Q24H    doxycycline (VIBRAMYCIN) IV  100 mg IntraVENous Q12H    enoxaparin  40 mg SubCUTAneous Daily      sodium chloride 5 mL/hr at 23 8578     magnesium sulfate, sodium phosphate IVPB **OR** sodium phosphate IVPB **OR** sodium phosphate IVPB, Menthol,
Pt admitted to  [address] 18 02 19 in a wheelchair and via direct admit. Complaints: weakness, cough,general body aches, diarrhea started yesterday. IV none infusing into the hand right, condition patent and no redness at a rate of  mls/ hour  IV site free of s/s of infection or infiltration. Vital signs obtained. Assessment and data collection initiated. Two nurse skin assessment performed by Dick Turpin RN and Nilam Watkins. Oriented to room. Policies and procedures for [facility] explained. Nitza MARROQUIN discussed hourly rounding with patient addressing 5 P's. Fall prevention and safety brochure discussed with patient. Bed alarm on. Call light in reach.
bruits  Chest - clear to auscultation, no wheezes, rales or rhonchi, symmetric air entry  Heart - no murmurs noted, no gallops noted, no JVD, tachycardic  Abdomen - liver palp, slightly tender  Neurological - alert, oriented, normal speech, no focal findings or movement disorder noted  Musculoskeletal - no muscular tenderness noted  Extremities - peripheral pulses normal, no pedal edema, no clubbing or cyanosis  Skin - normal coloration and turgor, no rashes, no suspicious skin lesions noted    Data: (All radiographs, tracings, PFTs, and imaging are personally viewed and interpreted unless otherwise noted).    Reviewed all labs, images      Electronically signed by Teresa Guerra MD on 4/12/2023 at 6:51 AM
Normal tone. Skin: Warm and dry. No rashes or lesions. Neurologic:  No focal sensory/motor deficits in the upper and lower extremities. Psychiatric: Alert and oriented, normal insight and thought content. Flat affect. Fluids: started today  PT/OT:   Dispo: pendig improvement in symptoms.      Andrew Brownlee DO  4/14/2023  8:42 AM

## 2023-04-15 VITALS
TEMPERATURE: 98.4 F | DIASTOLIC BLOOD PRESSURE: 75 MMHG | HEIGHT: 72 IN | WEIGHT: 203 LBS | BODY MASS INDEX: 27.5 KG/M2 | SYSTOLIC BLOOD PRESSURE: 113 MMHG | OXYGEN SATURATION: 94 % | RESPIRATION RATE: 19 BRPM | HEART RATE: 94 BPM

## 2023-04-15 LAB
ALBUMIN SERPL BCG-MCNC: 2.3 G/DL (ref 3.5–5.1)
ALP SERPL-CCNC: 84 U/L (ref 38–126)
ALT SERPL W/O P-5'-P-CCNC: 72 U/L (ref 11–66)
ANION GAP SERPL CALC-SCNC: 10 MEQ/L (ref 8–16)
AST SERPL-CCNC: 103 U/L (ref 5–40)
B PERT FHA IGM SER QL: POSITIVE
B PERT+PARA.FHA IGG SER QL LINE BLOT: POSITIVE
B PERT.PT 100 IGG SER QL LINE BLOT: ABNORMAL
B PERT.PT IGG SER QL LINE BLOT: POSITIVE
B PERT.PT IGM SER-ACNC: NEGATIVE
BILIRUB SERPL-MCNC: 0.7 MG/DL (ref 0.3–1.2)
BUN SERPL-MCNC: 7 MG/DL (ref 7–22)
CALCIUM SERPL-MCNC: 7.3 MG/DL (ref 8.5–10.5)
CHLORIDE SERPL-SCNC: 100 MEQ/L (ref 98–111)
CMV IGM SERPL-ACNC: > 240 AU/ML
CO2 SERPL-SCNC: 21 MEQ/L (ref 23–33)
CREAT SERPL-MCNC: 0.6 MG/DL (ref 0.4–1.2)
FOLATE SERPL-MCNC: 14 NG/ML (ref 4.8–24.2)
GAMMA INTERFERON BACKGROUND BLD IA-ACNC: 0.49 IU/ML
GFR SERPL CREATININE-BSD FRML MDRD: > 60 ML/MIN/1.73M2
GLUCOSE SERPL-MCNC: 101 MG/DL (ref 70–108)
HGB RETIC QN AUTO: 35.3 PG (ref 28.2–35.7)
IMM RETICS NFR: 17.4 % (ref 2.3–13.4)
M TB IFN-G BLD-IMP: NEGATIVE
M TB IFN-G CD4+ BCKGRND COR BLD-ACNC: 0.08 IU/ML (ref 0–0.34)
M TB IFN-G CD4+CD8+ BCKGRND COR BLD-ACNC: 0.05 IU/ML (ref 0–0.34)
MAGNESIUM SERPL-MCNC: 1.9 MG/DL (ref 1.6–2.4)
MITOGEN IGNF BCKGRD COR BLD-ACNC: > 10 IU/ML
NARRATIVE DIAGNOSTIC REPORT-IMP: ABNORMAL
NARRATIVE DIAGNOSTIC REPORT-IMP: ABNORMAL
NUCLEAR IGG SER QL IA: NORMAL
OSMOLALITY UR: 468 MOSMOL/KG (ref 250–750)
POTASSIUM SERPL-SCNC: 3.9 MEQ/L (ref 3.5–5.2)
PROT SERPL-MCNC: 5.2 G/DL (ref 6.1–8)
RETICS # AUTO: 113 THOU/MM3 (ref 20–115)
RETICS/RBC NFR AUTO: 3.2 % (ref 0.5–2)
SODIUM SERPL-SCNC: 128 MEQ/L (ref 135–145)
SODIUM SERPL-SCNC: 131 MEQ/L (ref 135–145)
SODIUM SERPL-SCNC: 131 MEQ/L (ref 135–145)
VIT B12 SERPL-MCNC: 874 PG/ML (ref 211–911)

## 2023-04-15 PROCEDURE — 99239 HOSP IP/OBS DSCHRG MGMT >30: CPT | Performed by: INTERNAL MEDICINE

## 2023-04-15 PROCEDURE — 2500000003 HC RX 250 WO HCPCS: Performed by: INTERNAL MEDICINE

## 2023-04-15 PROCEDURE — 36415 COLL VENOUS BLD VENIPUNCTURE: CPT

## 2023-04-15 PROCEDURE — 83735 ASSAY OF MAGNESIUM: CPT

## 2023-04-15 PROCEDURE — 85046 RETICYTE/HGB CONCENTRATE: CPT

## 2023-04-15 PROCEDURE — 84295 ASSAY OF SERUM SODIUM: CPT

## 2023-04-15 PROCEDURE — 6360000002 HC RX W HCPCS: Performed by: NURSE PRACTITIONER

## 2023-04-15 PROCEDURE — 6370000000 HC RX 637 (ALT 250 FOR IP): Performed by: INTERNAL MEDICINE

## 2023-04-15 PROCEDURE — 82607 VITAMIN B-12: CPT

## 2023-04-15 PROCEDURE — 83935 ASSAY OF URINE OSMOLALITY: CPT

## 2023-04-15 PROCEDURE — 82746 ASSAY OF FOLIC ACID SERUM: CPT

## 2023-04-15 PROCEDURE — 2580000003 HC RX 258: Performed by: INTERNAL MEDICINE

## 2023-04-15 PROCEDURE — 6360000002 HC RX W HCPCS: Performed by: INTERNAL MEDICINE

## 2023-04-15 PROCEDURE — 80053 COMPREHEN METABOLIC PANEL: CPT

## 2023-04-15 RX ORDER — CEFDINIR 300 MG/1
300 CAPSULE ORAL 2 TIMES DAILY
Qty: 14 CAPSULE | Refills: 0 | Status: SHIPPED | OUTPATIENT
Start: 2023-04-15 | End: 2023-04-22

## 2023-04-15 RX ORDER — BENZONATATE 200 MG/1
200 CAPSULE ORAL 3 TIMES DAILY PRN
Qty: 21 CAPSULE | Refills: 0 | Status: SHIPPED | OUTPATIENT
Start: 2023-04-15 | End: 2023-04-22

## 2023-04-15 RX ORDER — SODIUM CHLORIDE 1000 MG
2 TABLET, SOLUBLE MISCELLANEOUS 2 TIMES DAILY
Qty: 120 TABLET | Refills: 1 | Status: SHIPPED | OUTPATIENT
Start: 2023-04-15

## 2023-04-15 RX ORDER — LANOLIN ALCOHOL/MO/W.PET/CERES
100 CREAM (GRAM) TOPICAL DAILY
Status: DISCONTINUED | OUTPATIENT
Start: 2023-04-16 | End: 2023-04-15 | Stop reason: HOSPADM

## 2023-04-15 RX ORDER — MULTIVITAMIN WITH IRON
1 TABLET ORAL DAILY
Qty: 30 TABLET | Refills: 0 | Status: SHIPPED | OUTPATIENT
Start: 2023-04-16

## 2023-04-15 RX ORDER — SODIUM CHLORIDE 1000 MG
2 TABLET, SOLUBLE MISCELLANEOUS
Status: DISCONTINUED | OUTPATIENT
Start: 2023-04-15 | End: 2023-04-15 | Stop reason: HOSPADM

## 2023-04-15 RX ORDER — FOLIC ACID 1 MG/1
1 TABLET ORAL DAILY
Qty: 30 TABLET | Refills: 3 | Status: SHIPPED | OUTPATIENT
Start: 2023-04-16

## 2023-04-15 RX ORDER — DOXYCYCLINE HYCLATE 100 MG
100 TABLET ORAL 2 TIMES DAILY
Qty: 10 TABLET | Refills: 0 | Status: SHIPPED | OUTPATIENT
Start: 2023-04-15 | End: 2023-04-22

## 2023-04-15 RX ORDER — PANTOPRAZOLE SODIUM 40 MG/1
40 TABLET, DELAYED RELEASE ORAL
Qty: 30 TABLET | Refills: 3 | Status: SHIPPED | OUTPATIENT
Start: 2023-04-16

## 2023-04-15 RX ORDER — LANOLIN ALCOHOL/MO/W.PET/CERES
100 CREAM (GRAM) TOPICAL DAILY
Qty: 30 TABLET | Refills: 3 | Status: SHIPPED | OUTPATIENT
Start: 2023-04-16

## 2023-04-15 RX ORDER — NICOTINE 21 MG/24HR
1 PATCH, TRANSDERMAL 24 HOURS TRANSDERMAL DAILY
Qty: 30 PATCH | Refills: 3 | Status: SHIPPED | OUTPATIENT
Start: 2023-04-16

## 2023-04-15 RX ADMIN — SODIUM CHLORIDE 2 G: 1 TABLET ORAL at 11:47

## 2023-04-15 RX ADMIN — Medication 1 TABLET: at 08:31

## 2023-04-15 RX ADMIN — PANTOPRAZOLE SODIUM 40 MG: 40 TABLET, DELAYED RELEASE ORAL at 05:22

## 2023-04-15 RX ADMIN — DOXYCYCLINE 100 MG: 100 INJECTION, POWDER, LYOPHILIZED, FOR SOLUTION INTRAVENOUS at 04:54

## 2023-04-15 RX ADMIN — ACETAMINOPHEN 650 MG: 325 TABLET ORAL at 02:02

## 2023-04-15 RX ADMIN — THIAMINE HYDROCHLORIDE 100 MG: 100 INJECTION, SOLUTION INTRAMUSCULAR; INTRAVENOUS at 09:34

## 2023-04-15 RX ADMIN — SODIUM CHLORIDE 1 G: 1 TABLET ORAL at 08:31

## 2023-04-15 RX ADMIN — ENOXAPARIN SODIUM 40 MG: 100 INJECTION SUBCUTANEOUS at 08:30

## 2023-04-15 RX ADMIN — FOLIC ACID 1 MG: 1 TABLET ORAL at 08:31

## 2023-04-15 NOTE — DISCHARGE SUMMARY
Hospital Medicine Discharge Summary      Patient Identification:   Billy Hopper   : 1980  MRN: [id number]   Account: [de-identified]   Patient's PCP: JONNA Levi CNP    Admit Date: 2023   Discharge Date:  4/15/2023    Admitting Physician: No admitting provider for patient encounter. Discharge Physician: Dat De Anda DO       Discharge Diagnoses:  Fever/Cough/Atypical Pneumonia: thus far negative workup - Covid/flu, legionella, blood culture. Low suspicion for GI source (dilated CBD noted on arrival) buty minimal symptoms  -diarrhea could be cause but was not present prior to arrival and would not explain cough. Virus respiratory panel  - > negative covid, flu, pertussis, mycoplasma, chlamydophilia pneumonia, negative viruses. IgM and IgG bordatella slightly elevated, unclear significance d/w ID and no change in management. Unclear what is driving severely elevated ferritin (~9000) and moderate elevated CRP. DAYANARA negative. Consider further evaluation as outpatient.   -complete course of Cefdenir and Doxycycline (total ten days)  -repeat CT chest in 1.5 months. Mild acute nocturnal hypoxia - ? IZABEL (patient has not been tested). Resolved during daytime. -repeat cxr shows persistent RLL infiltrate / small right effusion  -small bilateral pleural effusions seen on CT abd pelvis        -suspect 2/2 hypoalbuminemia  Hyponatremia, Moderate: improved to 131. Stable - suspect a combination of volume depletion (improved with fluid challenge then leveled off), tea/toast diet, and SIADH (urine osm >400 after fluid challenge). -Salt tabs 2g BID on dc with close repeat labs. -Fluid restrict to 1500ml on dc. Acid Reflux: start on 1x dose IV ppi today and then PO protonix thereafter. PRN tums.     AFLD / mild hepatitis / hepatosplenomegaly: could be atypical infection or extramedullary hematopoesis but more than likely related to FLD and chronic alcohol abuse which could have increased

## 2023-04-15 NOTE — PLAN OF CARE
Problem: Discharge Planning  Goal: Discharge to home or other facility with appropriate resources  Flowsheets (Taken 4/13/2023 2303 by Connor Milner RN)  Discharge to home or other facility with appropriate resources:   Identify barriers to discharge with patient and caregiver   Arrange for needed discharge resources and transportation as appropriate     Problem: Safety - Adult  Goal: Free from fall injury  Flowsheets (Taken 4/15/2023 0310)  Free From Fall Injury:   Based on caregiver fall risk screen, instruct family/caregiver to ask for assistance with transferring infant if caregiver noted to have fall risk factors   Instruct family/caregiver on patient safety     Problem: Pain  Goal: Verbalizes/displays adequate comfort level or baseline comfort level  Flowsheets (Taken 4/15/2023 0310)  Verbalizes/displays adequate comfort level or baseline comfort level:   Assess pain using appropriate pain scale   Encourage patient to monitor pain and request assistance   Administer analgesics based on type and severity of pain and evaluate response   Implement non-pharmacological measures as appropriate and evaluate response   Consider cultural and social influences on pain and pain management   Notify Licensed Independent Practitioner if interventions unsuccessful or patient reports new pain     Problem: Skin/Tissue Integrity  Goal: Absence of new skin breakdown  Description: 1. Monitor for areas of redness and/or skin breakdown  2. Assess vascular access sites hourly  3. Every 4-6 hours minimum:  Change oxygen saturation probe site  4. Every 4-6 hours:  If on nasal continuous positive airway pressure, respiratory therapy assess nares and determine need for appliance change or resting period.   Note: Pt self turns while in the bed     Problem: Respiratory - Adult  Goal: Achieves optimal ventilation and oxygenation  Flowsheets (Taken 4/15/2023 0310)  Achieves optimal ventilation and oxygenation:   Initiate smoking
Problem: Discharge Planning  Goal: Discharge to home or other facility with appropriate resources  Outcome: Progressing     Problem: Safety - Adult  Goal: Free from fall injury  4/13/2023 0045 by Cammie Wick RN  Outcome: Progressing  4/12/2023 1831 by Israel Sanders RN  Outcome: Progressing     Problem: Pain  Goal: Verbalizes/displays adequate comfort level or baseline comfort level  Outcome: Progressing     Problem: Skin/Tissue Integrity  Goal: Absence of new skin breakdown  Description: 1. Monitor for areas of redness and/or skin breakdown  2. Assess vascular access sites hourly  3. Every 4-6 hours minimum:  Change oxygen saturation probe site  4. Every 4-6 hours:  If on nasal continuous positive airway pressure, respiratory therapy assess nares and determine need for appliance change or resting period.   Outcome: Progressing     Problem: Respiratory - Adult  Goal: Achieves optimal ventilation and oxygenation  Outcome: Progressing     Problem: Gastrointestinal - Adult  Goal: Maintains or returns to baseline bowel function  4/13/2023 0045 by Cammie Wick RN  Outcome: Progressing  Note: No diarrhea noted   4/12/2023 1831 by Israel Sanders RN  Outcome: Progressing     Problem: Nutrition Deficit:  Goal: Optimize nutritional status  4/13/2023 0045 by Cammie Wick RN  Outcome: Progressing  4/12/2023 1831 by Israel Sanders RN  Outcome: Progressing     Problem: Metabolic/Fluid and Electrolytes - Adult  Goal: Electrolytes maintained within normal limits  4/13/2023 0045 by Cammie Wick RN  Outcome: Not Progressing  Note: Electrolyte replacement  4/12/2023 1831 by Israel Sanders RN  Outcome: Progressing
Problem: Discharge Planning  Goal: Discharge to home or other facility with appropriate resources  Outcome: Progressing  Flowsheets (Taken 4/13/2023 2303)  Discharge to home or other facility with appropriate resources:   Identify barriers to discharge with patient and caregiver   Arrange for needed discharge resources and transportation as appropriate     Problem: Safety - Adult  Goal: Free from fall injury  Outcome: Progressing  Flowsheets (Taken 4/13/2023 2303)  Free From Fall Injury: Instruct family/caregiver on patient safety     Problem: Pain  Goal: Verbalizes/displays adequate comfort level or baseline comfort level  Outcome: Progressing  Flowsheets (Taken 4/13/2023 2303)  Verbalizes/displays adequate comfort level or baseline comfort level:   Encourage patient to monitor pain and request assistance   Assess pain using appropriate pain scale   Implement non-pharmacological measures as appropriate and evaluate response     Problem: Skin/Tissue Integrity  Goal: Absence of new skin breakdown  Description: 1. Monitor for areas of redness and/or skin breakdown  2. Assess vascular access sites hourly  3. Every 4-6 hours minimum:  Change oxygen saturation probe site  4. Every 4-6 hours:  If on nasal continuous positive airway pressure, respiratory therapy assess nares and determine need for appliance change or resting period. Outcome: Progressing  Note: No new skin breakdown so far this shift, pt ambulates well by self.       Problem: Respiratory - Adult  Goal: Achieves optimal ventilation and oxygenation  Outcome: Progressing  Flowsheets (Taken 4/13/2023 2303)  Achieves optimal ventilation and oxygenation: Assess for changes in respiratory status     Problem: Gastrointestinal - Adult  Goal: Maintains or returns to baseline bowel function  Outcome: Progressing  Flowsheets (Taken 4/13/2023 2303)  Maintains or returns to baseline bowel function: Assess bowel function     Problem: Metabolic/Fluid and Electrolytes
Problem: Safety - Adult  Goal: Free from fall injury  Outcome: Not Progressing     Problem: Metabolic/Fluid and Electrolytes - Adult  Goal: Electrolytes maintained within normal limits  Outcome: Not Progressing  Flowsheets (Taken 4/11/2023 2015)  Electrolytes maintained within normal limits: Monitor labs and assess patient for signs and symptoms of electrolyte imbalances  Note: Still noted with low sodium level       Problem: Discharge Planning  Goal: Discharge to home or other facility with appropriate resources  Outcome: Progressing  Flowsheets (Taken 4/11/2023 2015)  Discharge to home or other facility with appropriate resources: Identify barriers to discharge with patient and caregiver     Problem: Pain  Goal: Verbalizes/displays adequate comfort level or baseline comfort level  Outcome: Progressing     Problem: Skin/Tissue Integrity  Goal: Absence of new skin breakdown  Description: 1. Monitor for areas of redness and/or skin breakdown  2. Assess vascular access sites hourly  3. Every 4-6 hours minimum:  Change oxygen saturation probe site  4. Every 4-6 hours:  If on nasal continuous positive airway pressure, respiratory therapy assess nares and determine need for appliance change or resting period.   Outcome: Progressing     Problem: Respiratory - Adult  Goal: Achieves optimal ventilation and oxygenation  Outcome: Progressing  Flowsheets (Taken 4/11/2023 2015)  Achieves optimal ventilation and oxygenation: Assess for changes in respiratory status     Problem: Gastrointestinal - Adult  Goal: Maintains or returns to baseline bowel function  Outcome: Progressing
Problem: Safety - Adult  Goal: Free from fall injury  Outcome: Progressing     Problem: Gastrointestinal - Adult  Goal: Maintains or returns to baseline bowel function  Outcome: Progressing     Problem: Metabolic/Fluid and Electrolytes - Adult  Goal: Electrolytes maintained within normal limits  Outcome: Progressing     Problem: Nutrition Deficit:  Goal: Optimize nutritional status  Outcome: Progressing    Care plan reviewed with patient. Patient verbalized understanding of the plan of care and contribute to goal setting.
oxygenation:   Initiate smoking cessation protocol as indicated   Position to facilitate oxygenation and minimize respiratory effort   Assess for changes in mentation and behavior   Assess for changes in respiratory status   Oxygen supplementation based on oxygen saturation or arterial blood gases     Problem: Gastrointestinal - Adult  Goal: Maintains or returns to baseline bowel function  4/15/2023 1337 by Charissa Chauhan RN  Outcome: Progressing  Flowsheets (Taken 4/15/2023 0310 by Damir Last RN)  Maintains or returns to baseline bowel function:   Assess bowel function   Encourage oral fluids to ensure adequate hydration   Administer IV fluids as ordered to ensure adequate hydration   Administer ordered medications as needed   Encourage mobilization and activity  4/15/2023 0310 by Damir Last RN  Flowsheets (Taken 4/15/2023 0310)  Maintains or returns to baseline bowel function:   Assess bowel function   Encourage oral fluids to ensure adequate hydration   Administer IV fluids as ordered to ensure adequate hydration   Administer ordered medications as needed   Encourage mobilization and activity     Problem: Metabolic/Fluid and Electrolytes - Adult  Goal: Electrolytes maintained within normal limits  4/15/2023 1337 by Charissa Chauhan RN  Flowsheets (Taken 4/15/2023 0310 by Damir Last RN)  Electrolytes maintained within normal limits:   Monitor response to electrolyte replacements, including repeat lab results as appropriate   Administer electrolyte replacement as ordered   Monitor labs and assess patient for signs and symptoms of electrolyte imbalances  4/15/2023 0310 by Damir Last RN  Flowsheets (Taken 4/15/2023 0310)  Electrolytes maintained within normal limits:   Monitor response to electrolyte replacements, including repeat lab results as appropriate   Administer electrolyte replacement as ordered   Monitor labs and assess patient for signs and symptoms of electrolyte imbalances     Problem:

## 2023-04-15 NOTE — DISCHARGE INSTRUCTIONS
Repeat labs in 5 days  Limit fluids to 1500ml  Continue salt tabs  Antibiotics for 7 more days.    Recommend OTC Supplement (boost/ensure)  F/up with PCP after labs obtained  CT chest ordered for 1.5 months to follow up on abnormal chest imaging  Consider Jaime Soliz for resources about maintaining sobriety (details in follow up)

## 2023-04-16 ENCOUNTER — TELEPHONE (OUTPATIENT)
Dept: INTERNAL MEDICINE CLINIC | Age: 43
End: 2023-04-16

## 2023-04-16 LAB
ANCA AB PATTERN SER IF-IMP: NORMAL
ANCA IGG TITR SER IF: NORMAL {TITER}
BACTERIA BLD AEROBE CULT: NORMAL
BACTERIA BLD AEROBE CULT: NORMAL

## 2023-04-17 ENCOUNTER — TELEPHONE (OUTPATIENT)
Dept: FAMILY MEDICINE CLINIC | Age: 43
End: 2023-04-17

## 2023-04-17 LAB
H CAPSUL AG UR QL IA: NOT DETECTED
H CAPSUL AG UR-MCNC: NOT DETECTED NG/ML

## 2023-04-17 NOTE — TELEPHONE ENCOUNTER
Care Transitions Initial Follow Up Call    Outreach made within 2 business days of discharge: Yes    Patient: Boni Dexter Patient : 1980   MRN: [id number]  Reason for Admission: There are no discharge diagnoses documented for the most recent discharge. Discharge Date: 4/15/23       Spoke with: patient     Discharge department/facility: [facility]    TCM Interactive Patient Contact:  Was patient able to fill all prescriptions: Yes  Was patient instructed to bring all medications to the follow-up visit: Yes  Is patient taking all medications as directed in the discharge summary? Yes  Does patient understand their discharge instructions: Yes  Does patient have questions or concerns that need addressed prior to 7-14 day follow up office visit: no    Pt is following with DR. Tripp  Scheduled appointment with PCP within 7-14 days    Follow Up  No future appointments.     Petr Lewis LPN

## 2023-04-23 LAB
EKG ATRIAL RATE: 100 BPM
EKG ATRIAL RATE: 108 BPM
EKG P AXIS: 31 DEGREES
EKG P AXIS: 41 DEGREES
EKG P-R INTERVAL: 168 MS
EKG P-R INTERVAL: 172 MS
EKG Q-T INTERVAL: 340 MS
EKG Q-T INTERVAL: 344 MS
EKG QRS DURATION: 90 MS
EKG QRS DURATION: 92 MS
EKG QTC CALCULATION (BAZETT): 438 MS
EKG QTC CALCULATION (BAZETT): 460 MS
EKG R AXIS: 26 DEGREES
EKG R AXIS: 37 DEGREES
EKG T AXIS: 11 DEGREES
EKG T AXIS: 20 DEGREES
EKG VENTRICULAR RATE: 100 BPM
EKG VENTRICULAR RATE: 108 BPM

## 2024-12-09 ENCOUNTER — OFFICE VISIT (OUTPATIENT)
Dept: FAMILY MEDICINE CLINIC | Age: 44
End: 2024-12-09
Payer: MEDICAID

## 2024-12-09 VITALS
HEART RATE: 102 BPM | HEIGHT: 71 IN | BODY MASS INDEX: 29.4 KG/M2 | WEIGHT: 210 LBS | RESPIRATION RATE: 16 BRPM | DIASTOLIC BLOOD PRESSURE: 86 MMHG | SYSTOLIC BLOOD PRESSURE: 126 MMHG | OXYGEN SATURATION: 97 % | TEMPERATURE: 97.6 F

## 2024-12-09 DIAGNOSIS — K21.9 GASTROESOPHAGEAL REFLUX DISEASE WITHOUT ESOPHAGITIS: Primary | ICD-10-CM

## 2024-12-09 PROCEDURE — 99213 OFFICE O/P EST LOW 20 MIN: CPT | Performed by: NURSE PRACTITIONER

## 2024-12-09 RX ORDER — PANTOPRAZOLE SODIUM 40 MG/1
40 TABLET, DELAYED RELEASE ORAL
Qty: 30 TABLET | Refills: 2 | Status: SHIPPED | OUTPATIENT
Start: 2024-12-09

## 2024-12-09 SDOH — ECONOMIC STABILITY: FOOD INSECURITY: WITHIN THE PAST 12 MONTHS, YOU WORRIED THAT YOUR FOOD WOULD RUN OUT BEFORE YOU GOT MONEY TO BUY MORE.: NEVER TRUE

## 2024-12-09 SDOH — ECONOMIC STABILITY: FOOD INSECURITY: WITHIN THE PAST 12 MONTHS, THE FOOD YOU BOUGHT JUST DIDN'T LAST AND YOU DIDN'T HAVE MONEY TO GET MORE.: NEVER TRUE

## 2024-12-09 SDOH — ECONOMIC STABILITY: INCOME INSECURITY: HOW HARD IS IT FOR YOU TO PAY FOR THE VERY BASICS LIKE FOOD, HOUSING, MEDICAL CARE, AND HEATING?: NOT VERY HARD

## 2024-12-09 ASSESSMENT — PATIENT HEALTH QUESTIONNAIRE - PHQ9
SUM OF ALL RESPONSES TO PHQ9 QUESTIONS 1 & 2: 0
SUM OF ALL RESPONSES TO PHQ QUESTIONS 1-9: 0
1. LITTLE INTEREST OR PLEASURE IN DOING THINGS: NOT AT ALL
SUM OF ALL RESPONSES TO PHQ QUESTIONS 1-9: 0
2. FEELING DOWN, DEPRESSED OR HOPELESS: NOT AT ALL

## 2024-12-09 ASSESSMENT — ENCOUNTER SYMPTOMS
GASTROINTESTINAL NEGATIVE: 1
RESPIRATORY NEGATIVE: 1
EYES NEGATIVE: 1

## 2024-12-09 NOTE — PROGRESS NOTES
Aden Smith is a 44 y.o. male who presents today for :  Chief Complaint   Patient presents with    Chest Pain    Gastroesophageal Reflux     Vitals:    12/09/24 1419   BP: 126/86   Pulse: (!) 102   Resp: 16   Temp: 97.6 °F (36.4 °C)   SpO2: 97%       HPI:     History of Present Illness  The patient is a 44-year-old male who presents for evaluation of heartburn.    He began experiencing frequent heartburn approximately 4 months ago. He recalls an incident where he consumed a Captain and Coke with a quarter of a lime, which triggered severe heartburn. He managed to alleviate the symptoms by taking Prilosec. A similar episode occurred a month and a half later at home after consuming a caffeine Coke. He experienced a burning sensation and pressure, which subsided after 30 to 40 minutes of rest. He has since eliminated caffeine from his diet. He also had an episode of heartburn after a late lunch at work, which he managed with Prilosec.     He is now cautious about his diet as certain foods seem to trigger his heartburn. His diet has shifted towards spicier foods, but this has not caused him significant discomfort. He avoids Advil, ibuprofen, and Tylenol. He continues to smoke.    His only medication is Prilosec, which he takes every three days. He has previously taken Nexium and Protonix, which were effective in managing his symptoms. He has been taking Prilosec since high school.    He reports no black or tarry stools and no pain other than the pressure associated with heartburn. Physical activity does not exacerbate his symptoms. He reports normal bowel movements and no constipation.    SOCIAL HISTORY  The patient smokes.         Patient Active Problem List   Diagnosis    Umbilical hernia without obstruction and without gangrene    Hyponatremia    Diarrhea    Elevated liver enzymes    Hypophosphatemia    Hypokalemia    Subacute cough    Elevated liver function tests    Pneumonia of both lungs due to infectious

## 2024-12-12 ENCOUNTER — HOSPITAL ENCOUNTER (OUTPATIENT)
Dept: CT IMAGING | Age: 44
Discharge: HOME | End: 2024-12-12
Payer: COMMERCIAL

## 2024-12-12 ENCOUNTER — OFFICE VISIT (OUTPATIENT)
Dept: FAMILY MEDICINE CLINIC | Age: 44
End: 2024-12-12
Payer: COMMERCIAL

## 2024-12-12 ENCOUNTER — HOSPITAL ENCOUNTER (OUTPATIENT)
Age: 44
Discharge: HOME | End: 2024-12-12
Payer: COMMERCIAL

## 2024-12-12 ENCOUNTER — TELEPHONE (OUTPATIENT)
Dept: FAMILY MEDICINE CLINIC | Age: 44
End: 2024-12-12

## 2024-12-12 VITALS
WEIGHT: 210 LBS | SYSTOLIC BLOOD PRESSURE: 122 MMHG | BODY MASS INDEX: 29.4 KG/M2 | RESPIRATION RATE: 16 BRPM | HEIGHT: 71 IN | DIASTOLIC BLOOD PRESSURE: 80 MMHG | TEMPERATURE: 97.8 F | HEART RATE: 68 BPM

## 2024-12-12 DIAGNOSIS — R10.13 EPIGASTRIC PAIN: ICD-10-CM

## 2024-12-12 DIAGNOSIS — R79.89 ELEVATED LFTS: ICD-10-CM

## 2024-12-12 DIAGNOSIS — R10.13 EPIGASTRIC PAIN: Primary | ICD-10-CM

## 2024-12-12 DIAGNOSIS — K83.8 DILATED BILE DUCT: ICD-10-CM

## 2024-12-12 LAB
ALBUMIN SERPL BCP-MCNC: 3.1 GM/DL (ref 3.4–5)
ALP SERPL-CCNC: 288 U/L (ref 46–116)
ALT SERPL W P-5'-P-CCNC: 368 U/L (ref 14–63)
AMYLASE SERPL-CCNC: 33 U/L (ref 25–115)
ANION GAP SERPL CALC-SCNC: 6 MEQ/L (ref 8–16)
AST SERPL W P-5'-P-CCNC: 204 U/L (ref 15–37)
BASOPHILS # BLD: 0.3 % (ref 0–3)
BASOPHILS ABSOLUTE: 0 THOU/MM3 (ref 0–0.1)
BILIRUB SERPL-MCNC: 2.4 MG/DL (ref 0.2–1)
BUN SERPL-MCNC: 10 MG/DL (ref 7–18)
CALCIUM SERPL-MCNC: 8.6 MG/DL (ref 8.5–10.1)
CHLORIDE SERPL-SCNC: 92 MEQ/L (ref 98–107)
CO2 SERPL-SCNC: 30 MEQ/L (ref 21–32)
CREAT SERPL-MCNC: 0.9 MG/DL (ref 0.6–1.3)
EOSINOPHILS ABSOLUTE: 0.3 THOU/MM3 (ref 0–0.5)
EOSINOPHILS RELATIVE PERCENT: 4.2 % (ref 0–4)
GFR SERPL CREATININE-BSD FRML MDRD: > 90 ML/MIN/1.73M2
GLUCOSE SERPL-MCNC: 100 MG/DL (ref 74–106)
HCT VFR BLD CALC: 47.5 % (ref 42–52)
HEMOGLOBIN: 16.2 GM/DL (ref 14–18)
IMMATURE GRANS (ABS): 0.06 THOU/MM3 (ref 0–0.07)
IMMATURE GRANULOCYTES %: 1 %
LIPASE SERPL-CCNC: 57 U/L (ref 16–77)
LYMPHOCYTES # BLD AUTO: 27.6 % (ref 15–47)
LYMPHOCYTES ABSOLUTE: 1.7 THOU/MM3 (ref 1–4.8)
MCH RBC QN AUTO: 32.4 PG (ref 26–32)
MCHC RBC AUTO-ENTMCNC: 34.1 GM/DL (ref 31–35)
MCV RBC AUTO: 95 FL (ref 80–94)
MONOCYTES: 0.6 THOU/MM3 (ref 0.3–1.3)
MONOCYTES: 10.4 % (ref 0–12)
NEUTROPHILS ABSOLUTE: 3.5 THOU/MM3 (ref 1.8–7.7)
PDW BLD-RTO: 12 % (ref 11.5–14.9)
PLATELET # BLD AUTO: 211 THOU/MM3 (ref 130–400)
PMV BLD AUTO: 9.5 FL (ref 9.4–12.4)
POTASSIUM SERPL-SCNC: 3.7 MEQ/L (ref 3.5–5.1)
PROT SERPL-MCNC: 7.4 GM/DL (ref 6.4–8.2)
RBC # BLD: 5 MILL/MM3 (ref 4.5–6.1)
SEG NEUTROPHILS: 57 % (ref 43–75)
SODIUM SERPL-SCNC: 128 MEQ/L (ref 136–145)
WBC # BLD: 6.2 THOU/MM3 (ref 4.8–10.8)

## 2024-12-12 PROCEDURE — 74177 CT ABD & PELVIS W/CONTRAST: CPT

## 2024-12-12 PROCEDURE — 6360000004 HC RX CONTRAST MEDICATION: Performed by: NURSE PRACTITIONER

## 2024-12-12 PROCEDURE — 80053 COMPREHEN METABOLIC PANEL: CPT

## 2024-12-12 PROCEDURE — 99214 OFFICE O/P EST MOD 30 MIN: CPT | Performed by: NURSE PRACTITIONER

## 2024-12-12 PROCEDURE — 82150 ASSAY OF AMYLASE: CPT

## 2024-12-12 PROCEDURE — 85025 COMPLETE CBC W/AUTO DIFF WBC: CPT

## 2024-12-12 PROCEDURE — 83690 ASSAY OF LIPASE: CPT

## 2024-12-12 RX ORDER — IOPAMIDOL 755 MG/ML
100 INJECTION, SOLUTION INTRAVASCULAR
Status: COMPLETED | OUTPATIENT
Start: 2024-12-12 | End: 2024-12-12

## 2024-12-12 RX ADMIN — IOPAMIDOL 100 ML: 755 INJECTION, SOLUTION INTRAVENOUS at 14:35

## 2024-12-12 ASSESSMENT — ENCOUNTER SYMPTOMS
RESPIRATORY NEGATIVE: 1
ABDOMINAL PAIN: 1
EYES NEGATIVE: 1

## 2024-12-12 NOTE — TELEPHONE ENCOUNTER
Pt called requesting to be seen asap today and states he can not wait until Monday for next appointment because he is worried he will end up in the hospital.    He states that he left out information from his appointment on 12/9/2024  He feels like he is dehydrated and bloated in his abdomen.  States that yesterday he had a 4 hour long episode where she was having chest discomfort that was beyond a 10. Episode started after eating lunch.     States that he had a sausage mcmuffin for breakfast yesterday. The chest discomfort started after taking 6-8 bites of lunch which was beef and noodles, mashed potatoes.  Asked if he did anything to relieve the discomfort and what helped, explained that laying down helped and moving around from up, down to bending over.  He states that he felt like he needed to have a BM but couldn't and then about 10 minutes later did have a BM and felt much better.  He is taking the protonix that you prescribed, took it yesterday before eating lunch .

## 2024-12-12 NOTE — PROGRESS NOTES
Aden Smith is a 44 y.o. male who presents today for :  Chief Complaint   Patient presents with    GI Problem     Vitals:    12/12/24 1317   BP: 122/80   Pulse: 68   Resp: 16   Temp: 97.8 °F (36.6 °C)       HPI:     History of Present Illness  The patient presents for evaluation of abdominal pain.    He recounts an episode of abdominal discomfort that began after consuming a sausage McMuffin at approximately 7:00 AM the previous day, which he consumed slowly over a period of two hours. He had taken his medication around 1:00 PM the day prior and wanted to be consistent with the timing. He noticed a gradual tightening in his stomach post-meal, which escalated to significant discomfort by 1:00 PM. Despite this, he proceeded to consume beef noodles and mashed potatoes for lunch, having taken his medication just prior to eating. After approximately 6 to 8 bites over a span of 30 minutes, he experienced severe abdominal pain, describing it as feeling like a \"ball of rock.\" The pain was so intense that he had to leave work and return home. He reports that the pain radiated to his back, but he does not believe this was related to his abdominal symptoms. Upon returning home, he attempted various positions to alleviate the discomfort, including lying flat on his back, sides, and stomach, but found no relief. He also tried stretching and leaning forward, but these maneuvers were also ineffective. He considered seeking emergency care due to the severity of the pain. The pain was not cramp-like, but rather a persistent discomfort that he initially attributed to heartburn. He eventually found some relief after smoking a cigarette and having a bowel movement, which he describes as normal in color. He did not consume any other food that night. He reports feeling well today, having only consumed two granola bars and a banana. He took his medication around noon and has not experienced any further issues. He also reports that he

## 2024-12-16 ENCOUNTER — TELEPHONE (OUTPATIENT)
Dept: FAMILY MEDICINE CLINIC | Age: 44
End: 2024-12-16

## 2024-12-16 DIAGNOSIS — R79.89 ELEVATED LIVER FUNCTION TESTS: Primary | ICD-10-CM

## 2024-12-16 DIAGNOSIS — E80.6 HYPERBILIRUBINEMIA: ICD-10-CM

## 2024-12-16 NOTE — TELEPHONE ENCOUNTER
Called pt.  He is still having dark urine and off and on fever and chills.  Ordered repeat lft and hepatitis panel.  Also urgent referral to GI

## 2024-12-17 ENCOUNTER — APPOINTMENT (OUTPATIENT)
Dept: MRI IMAGING | Age: 44
DRG: 446 | End: 2024-12-17
Payer: COMMERCIAL

## 2024-12-17 ENCOUNTER — APPOINTMENT (OUTPATIENT)
Dept: ULTRASOUND IMAGING | Age: 44
DRG: 446 | End: 2024-12-17
Payer: COMMERCIAL

## 2024-12-17 ENCOUNTER — HOSPITAL ENCOUNTER (INPATIENT)
Age: 44
LOS: 2 days | Discharge: HOME | DRG: 446 | End: 2024-12-19
Admitting: STUDENT IN AN ORGANIZED HEALTH CARE EDUCATION/TRAINING PROGRAM
Payer: COMMERCIAL

## 2024-12-17 DIAGNOSIS — R74.01 TRANSAMINITIS: Primary | ICD-10-CM

## 2024-12-17 DIAGNOSIS — R10.11 ABDOMINAL PAIN, RIGHT UPPER QUADRANT: ICD-10-CM

## 2024-12-17 DIAGNOSIS — K83.8 DILATED CBD, ACQUIRED: ICD-10-CM

## 2024-12-17 PROBLEM — J18.9 PNEUMONIA OF BOTH LUNGS DUE TO INFECTIOUS ORGANISM: Status: RESOLVED | Noted: 2023-04-13 | Resolved: 2024-12-17

## 2024-12-17 PROBLEM — K81.9 CHOLECYSTITIS: Status: ACTIVE | Noted: 2024-12-17

## 2024-12-17 PROBLEM — R05.2 SUBACUTE COUGH: Status: RESOLVED | Noted: 2023-04-12 | Resolved: 2024-12-17

## 2024-12-17 LAB
ALBUMIN SERPL BCG-MCNC: 3.9 G/DL (ref 3.5–5.1)
ALP SERPL-CCNC: 417 U/L (ref 38–126)
ALT SERPL W/O P-5'-P-CCNC: 291 U/L (ref 11–66)
ANION GAP SERPL CALC-SCNC: 13 MEQ/L (ref 8–16)
AST SERPL-CCNC: 191 U/L (ref 5–40)
BASOPHILS ABSOLUTE: 0.1 THOU/MM3 (ref 0–0.1)
BASOPHILS NFR BLD AUTO: 1.2 %
BILIRUB SERPL-MCNC: 2.7 MG/DL (ref 0.3–1.2)
BUN SERPL-MCNC: 10 MG/DL (ref 7–22)
CALCIUM SERPL-MCNC: 10 MG/DL (ref 8.5–10.5)
CHLORIDE SERPL-SCNC: 98 MEQ/L (ref 98–111)
CK SERPL-CCNC: 23 U/L (ref 55–170)
CO2 SERPL-SCNC: 24 MEQ/L (ref 23–33)
CREAT SERPL-MCNC: 0.8 MG/DL (ref 0.4–1.2)
DEPRECATED RDW RBC AUTO: 42.7 FL (ref 35–45)
EKG ATRIAL RATE: 77 BPM
EKG P AXIS: 53 DEGREES
EKG P-R INTERVAL: 180 MS
EKG Q-T INTERVAL: 390 MS
EKG QRS DURATION: 100 MS
EKG QTC CALCULATION (BAZETT): 441 MS
EKG R AXIS: 29 DEGREES
EKG T AXIS: 30 DEGREES
EKG VENTRICULAR RATE: 77 BPM
EOSINOPHIL NFR BLD AUTO: 3.3 %
EOSINOPHILS ABSOLUTE: 0.2 THOU/MM3 (ref 0–0.4)
ERYTHROCYTE [DISTWIDTH] IN BLOOD BY AUTOMATED COUNT: 12.2 % (ref 11.5–14.5)
GFR SERPL CREATININE-BSD FRML MDRD: > 90 ML/MIN/1.73M2
GLUCOSE SERPL-MCNC: 129 MG/DL (ref 70–108)
HAV IGM SER QL: NEGATIVE
HBV CORE IGM SERPL QL IA: NEGATIVE
HBV SURFACE AG SERPL QL IA: NEGATIVE
HCT VFR BLD AUTO: 46 % (ref 42–52)
HCV IGG SERPL QL IA: NEGATIVE
HGB BLD-MCNC: 15.6 GM/DL (ref 14–18)
IMM GRANULOCYTES # BLD AUTO: 0.06 THOU/MM3 (ref 0–0.07)
IMM GRANULOCYTES NFR BLD AUTO: 0.9 %
LIPASE SERPL-CCNC: 36.8 U/L (ref 5.6–51.3)
LYMPHOCYTES ABSOLUTE: 1.1 THOU/MM3 (ref 1–4.8)
LYMPHOCYTES NFR BLD AUTO: 17.7 %
MCH RBC QN AUTO: 32.1 PG (ref 26–33)
MCHC RBC AUTO-ENTMCNC: 33.9 GM/DL (ref 32.2–35.5)
MCV RBC AUTO: 94.7 FL (ref 80–94)
MONOCYTES ABSOLUTE: 0.4 THOU/MM3 (ref 0.4–1.3)
MONOCYTES NFR BLD AUTO: 7 %
NEUTROPHILS ABSOLUTE: 4.5 THOU/MM3 (ref 1.8–7.7)
NEUTROPHILS NFR BLD AUTO: 69.9 %
NRBC BLD AUTO-RTO: 0 /100 WBC
OSMOLALITY SERPL CALC.SUM OF ELEC: 270.8 MOSMOL/KG (ref 275–300)
PLATELET # BLD AUTO: 287 THOU/MM3 (ref 130–400)
PMV BLD AUTO: 9.6 FL (ref 9.4–12.4)
POTASSIUM SERPL-SCNC: 4.4 MEQ/L (ref 3.5–5.2)
PROT SERPL-MCNC: 7.7 G/DL (ref 6.1–8)
RBC # BLD AUTO: 4.86 MILL/MM3 (ref 4.7–6.1)
SODIUM SERPL-SCNC: 135 MEQ/L (ref 135–145)
TROPONIN, HIGH SENSITIVITY: < 6 NG/L (ref 0–12)
WBC # BLD AUTO: 6.4 THOU/MM3 (ref 4.8–10.8)

## 2024-12-17 PROCEDURE — 2500000003 HC RX 250 WO HCPCS

## 2024-12-17 PROCEDURE — 6360000002 HC RX W HCPCS

## 2024-12-17 PROCEDURE — 93005 ELECTROCARDIOGRAM TRACING: CPT

## 2024-12-17 PROCEDURE — 82550 ASSAY OF CK (CPK): CPT

## 2024-12-17 PROCEDURE — 6360000002 HC RX W HCPCS: Performed by: NURSE PRACTITIONER

## 2024-12-17 PROCEDURE — 85025 COMPLETE CBC W/AUTO DIFF WBC: CPT

## 2024-12-17 PROCEDURE — 6360000004 HC RX CONTRAST MEDICATION: Performed by: NURSE PRACTITIONER

## 2024-12-17 PROCEDURE — 87040 BLOOD CULTURE FOR BACTERIA: CPT

## 2024-12-17 PROCEDURE — 76705 ECHO EXAM OF ABDOMEN: CPT

## 2024-12-17 PROCEDURE — 83690 ASSAY OF LIPASE: CPT

## 2024-12-17 PROCEDURE — 74183 MRI ABD W/O CNTR FLWD CNTR: CPT

## 2024-12-17 PROCEDURE — 1200000000 HC SEMI PRIVATE

## 2024-12-17 PROCEDURE — 2580000003 HC RX 258

## 2024-12-17 PROCEDURE — 96375 TX/PRO/DX INJ NEW DRUG ADDON: CPT

## 2024-12-17 PROCEDURE — 99222 1ST HOSP IP/OBS MODERATE 55: CPT

## 2024-12-17 PROCEDURE — A9579 GAD-BASE MR CONTRAST NOS,1ML: HCPCS | Performed by: NURSE PRACTITIONER

## 2024-12-17 PROCEDURE — 93010 ELECTROCARDIOGRAM REPORT: CPT | Performed by: NUCLEAR MEDICINE

## 2024-12-17 PROCEDURE — 99285 EMERGENCY DEPT VISIT HI MDM: CPT

## 2024-12-17 PROCEDURE — 80074 ACUTE HEPATITIS PANEL: CPT

## 2024-12-17 PROCEDURE — 99222 1ST HOSP IP/OBS MODERATE 55: CPT | Performed by: NURSE PRACTITIONER

## 2024-12-17 PROCEDURE — 80053 COMPREHEN METABOLIC PANEL: CPT

## 2024-12-17 PROCEDURE — 36415 COLL VENOUS BLD VENIPUNCTURE: CPT

## 2024-12-17 PROCEDURE — 96374 THER/PROPH/DIAG INJ IV PUSH: CPT

## 2024-12-17 PROCEDURE — 84484 ASSAY OF TROPONIN QUANT: CPT

## 2024-12-17 RX ORDER — KETOROLAC TROMETHAMINE 30 MG/ML
30 INJECTION, SOLUTION INTRAMUSCULAR; INTRAVENOUS ONCE
Status: COMPLETED | OUTPATIENT
Start: 2024-12-17 | End: 2024-12-17

## 2024-12-17 RX ORDER — PANTOPRAZOLE SODIUM 40 MG/10ML
40 INJECTION, POWDER, LYOPHILIZED, FOR SOLUTION INTRAVENOUS 2 TIMES DAILY
Status: DISCONTINUED | OUTPATIENT
Start: 2024-12-17 | End: 2024-12-19 | Stop reason: HOSPADM

## 2024-12-17 RX ORDER — POTASSIUM CHLORIDE 7.45 MG/ML
10 INJECTION INTRAVENOUS PRN
Status: DISCONTINUED | OUTPATIENT
Start: 2024-12-17 | End: 2024-12-19 | Stop reason: HOSPADM

## 2024-12-17 RX ORDER — MORPHINE SULFATE 4 MG/ML
4 INJECTION, SOLUTION INTRAMUSCULAR; INTRAVENOUS ONCE
Status: COMPLETED | OUTPATIENT
Start: 2024-12-17 | End: 2024-12-17

## 2024-12-17 RX ORDER — SODIUM CHLORIDE 9 MG/ML
INJECTION, SOLUTION INTRAVENOUS PRN
Status: DISCONTINUED | OUTPATIENT
Start: 2024-12-17 | End: 2024-12-19 | Stop reason: HOSPADM

## 2024-12-17 RX ORDER — PANTOPRAZOLE SODIUM 40 MG/1
40 TABLET, DELAYED RELEASE ORAL
Status: DISCONTINUED | OUTPATIENT
Start: 2024-12-18 | End: 2024-12-17

## 2024-12-17 RX ORDER — POLYETHYLENE GLYCOL 3350 17 G/17G
17 POWDER, FOR SOLUTION ORAL DAILY PRN
Status: DISCONTINUED | OUTPATIENT
Start: 2024-12-17 | End: 2024-12-19 | Stop reason: HOSPADM

## 2024-12-17 RX ORDER — ONDANSETRON 2 MG/ML
4 INJECTION INTRAMUSCULAR; INTRAVENOUS EVERY 6 HOURS PRN
Status: DISCONTINUED | OUTPATIENT
Start: 2024-12-17 | End: 2024-12-19 | Stop reason: HOSPADM

## 2024-12-17 RX ORDER — ONDANSETRON 2 MG/ML
4 INJECTION INTRAMUSCULAR; INTRAVENOUS ONCE
Status: COMPLETED | OUTPATIENT
Start: 2024-12-17 | End: 2024-12-17

## 2024-12-17 RX ORDER — MAGNESIUM SULFATE IN WATER 40 MG/ML
2000 INJECTION, SOLUTION INTRAVENOUS PRN
Status: DISCONTINUED | OUTPATIENT
Start: 2024-12-17 | End: 2024-12-19 | Stop reason: HOSPADM

## 2024-12-17 RX ORDER — SODIUM CHLORIDE 0.9 % (FLUSH) 0.9 %
5-40 SYRINGE (ML) INJECTION PRN
Status: DISCONTINUED | OUTPATIENT
Start: 2024-12-17 | End: 2024-12-19 | Stop reason: HOSPADM

## 2024-12-17 RX ORDER — FENTANYL CITRATE 50 UG/ML
50 INJECTION, SOLUTION INTRAMUSCULAR; INTRAVENOUS ONCE
Status: COMPLETED | OUTPATIENT
Start: 2024-12-17 | End: 2024-12-17

## 2024-12-17 RX ORDER — ONDANSETRON 4 MG/1
4 TABLET, ORALLY DISINTEGRATING ORAL EVERY 8 HOURS PRN
Status: DISCONTINUED | OUTPATIENT
Start: 2024-12-17 | End: 2024-12-19 | Stop reason: HOSPADM

## 2024-12-17 RX ORDER — SODIUM CHLORIDE 0.9 % (FLUSH) 0.9 %
5-40 SYRINGE (ML) INJECTION EVERY 12 HOURS SCHEDULED
Status: DISCONTINUED | OUTPATIENT
Start: 2024-12-17 | End: 2024-12-19 | Stop reason: HOSPADM

## 2024-12-17 RX ORDER — POTASSIUM CHLORIDE 1500 MG/1
40 TABLET, EXTENDED RELEASE ORAL PRN
Status: DISCONTINUED | OUTPATIENT
Start: 2024-12-17 | End: 2024-12-19 | Stop reason: HOSPADM

## 2024-12-17 RX ADMIN — SODIUM CHLORIDE, PRESERVATIVE FREE 10 ML: 5 INJECTION INTRAVENOUS at 19:25

## 2024-12-17 RX ADMIN — KETOROLAC TROMETHAMINE 30 MG: 30 INJECTION, SOLUTION INTRAMUSCULAR at 10:23

## 2024-12-17 RX ADMIN — FENTANYL CITRATE 50 MCG: 50 INJECTION, SOLUTION INTRAMUSCULAR; INTRAVENOUS at 10:24

## 2024-12-17 RX ADMIN — HYDROMORPHONE HYDROCHLORIDE 0.25 MG: 1 INJECTION, SOLUTION INTRAMUSCULAR; INTRAVENOUS; SUBCUTANEOUS at 13:56

## 2024-12-17 RX ADMIN — PIPERACILLIN AND TAZOBACTAM 4500 MG: 4; .5 INJECTION, POWDER, FOR SOLUTION INTRAVENOUS at 13:22

## 2024-12-17 RX ADMIN — PANTOPRAZOLE SODIUM 40 MG: 40 INJECTION, POWDER, FOR SOLUTION INTRAVENOUS at 19:25

## 2024-12-17 RX ADMIN — MORPHINE SULFATE 4 MG: 4 INJECTION, SOLUTION INTRAMUSCULAR; INTRAVENOUS at 11:55

## 2024-12-17 RX ADMIN — ONDANSETRON 4 MG: 2 INJECTION INTRAMUSCULAR; INTRAVENOUS at 10:23

## 2024-12-17 RX ADMIN — PANTOPRAZOLE SODIUM 40 MG: 40 INJECTION, POWDER, FOR SOLUTION INTRAVENOUS at 15:14

## 2024-12-17 RX ADMIN — PIPERACILLIN AND TAZOBACTAM 3375 MG: 3; .375 INJECTION, POWDER, FOR SOLUTION INTRAVENOUS at 19:32

## 2024-12-17 RX ADMIN — GADOTERIDOL 20 ML: 279.3 INJECTION, SOLUTION INTRAVENOUS at 15:05

## 2024-12-17 RX ADMIN — HYDROMORPHONE HYDROCHLORIDE 0.25 MG: 1 INJECTION, SOLUTION INTRAMUSCULAR; INTRAVENOUS; SUBCUTANEOUS at 18:25

## 2024-12-17 ASSESSMENT — PAIN DESCRIPTION - FREQUENCY: FREQUENCY: CONTINUOUS

## 2024-12-17 ASSESSMENT — ENCOUNTER SYMPTOMS
CONSTIPATION: 0
CHEST TIGHTNESS: 0
TROUBLE SWALLOWING: 0
EYES NEGATIVE: 1
SINUS PAIN: 1
VOMITING: 0
ABDOMINAL DISTENTION: 0
RESPIRATORY NEGATIVE: 1
WHEEZING: 0
RECTAL PAIN: 0
DIARRHEA: 0
SHORTNESS OF BREATH: 1
SINUS PRESSURE: 1
RHINORRHEA: 1
COUGH: 0
ABDOMINAL DISTENTION: 1
ABDOMINAL PAIN: 1
BLOOD IN STOOL: 0
SHORTNESS OF BREATH: 0
ALLERGIC/IMMUNOLOGIC NEGATIVE: 1
NAUSEA: 0
ANAL BLEEDING: 0
SORE THROAT: 0
BACK PAIN: 1

## 2024-12-17 ASSESSMENT — PAIN DESCRIPTION - ORIENTATION: ORIENTATION: RIGHT;UPPER

## 2024-12-17 ASSESSMENT — PAIN DESCRIPTION - DESCRIPTORS
DESCRIPTORS: ACHING
DESCRIPTORS: DISCOMFORT

## 2024-12-17 ASSESSMENT — PAIN SCALES - GENERAL
PAINLEVEL_OUTOF10: 9
PAINLEVEL_OUTOF10: 6
PAINLEVEL_OUTOF10: 8
PAINLEVEL_OUTOF10: 4
PAINLEVEL_OUTOF10: 3
PAINLEVEL_OUTOF10: 3
PAINLEVEL_OUTOF10: 0

## 2024-12-17 ASSESSMENT — PAIN DESCRIPTION - PAIN TYPE: TYPE: ACUTE PAIN

## 2024-12-17 ASSESSMENT — PAIN DESCRIPTION - LOCATION
LOCATION: ABDOMEN

## 2024-12-17 ASSESSMENT — PAIN - FUNCTIONAL ASSESSMENT: PAIN_FUNCTIONAL_ASSESSMENT: 0-10

## 2024-12-17 NOTE — ED NOTES
Pt presents to the ER for abdominal pain. Pt states that he has been going to GI for possible gallbladder issues. Pt states that he took 1000mg of motrin this morning and it has not touched his pain. INT estatblished and blood sent to lab.

## 2024-12-17 NOTE — ED NOTES
Pt transported to [facility] in stable condition. Called floor and spoke to Lashawn prior to transport.

## 2024-12-17 NOTE — CONSULTS
[facility]  General Surgery Consult Note  ANTELMO Castorena for Dr. Dl Lizarraga MD FACS    Pt Name: Aden Smith  MRN: [id number]  YOB: 1980  Date of evaluation: 12/17/2024  Primary Care Physician: Michelet Hernandez APRN - CNP  Patient evaluated at the request of  DARA Saldivar  Reason for evaluation: abdominal pain  ASSESSMENT   Epigastric pain  Transaminitis  Hyperbilirubinemia   GERD  Alcohol abuse  Tobacco abuse  Hepatic steatosis    has a past medical history of Anxiety, Chicken pox, Chronic low back pain, Fractures, stress, GERD (gastroesophageal reflux disease), Hypertension, and Umbilical hernia.  PLAN   NPO except ice chips and sips  MRCP to rule out choledocholithiasis  IV fluids  Antibiotics  Protonix  SCDs for DVT prophylaxis  Up ad mabel  Hospitalist for medical management  GI consultation  Labs reviewed. Monitor LFTs.  Smoking and alcohol cessation discussed  Await MRCP findings. Discussed pros and cons of cholecystectomy. Patient would like to proceed with surgery in-house if possible. Robotic, laparoscopic and open techniques discussed. Questions answered.   SUBJECTIVE   Chief complaint: Epigastric pain     History of present illness: Aden is a 44-year-old male patient who presents to the emergency department for epigastric pain.  Patient states has been ongoing for the last couple of weeks but over the last 12 hours pain is significantly worsened.  He was at the family doctor on Thursday complaining of abdominal pain after eating a sausage McMuffin sandwich.  CT abdomen, gallbladder ultrasound and HIDA scan were ordered.  He did have the CT scan done on Thursday which showed dilated common bile duct, no gallstones, otherwise chronic findings.  States pain is epigastric with radiation to the back.  Sharp and stabbing.  Worse with deep breath.  Pain medication has relieved some of the pain in the emergency department.  Associated nausea but no vomiting.  
JONNA Hamilton - CNP on 12/17/2024 at 1:02 PM    [facility]  Thank you for the consultation.

## 2024-12-17 NOTE — ED NOTES
ED to inpatient nurses report      Chief Complaint:  Chief Complaint   Patient presents with    Abdominal Pain     Present to ED from: home     MOA:     LOC: alert and orientated to name, place, date  Mobility: Independent  Oxygen Baseline: room air     Current needs required: room air     Code Status:   Full Code    What abnormal results were found and what did you give/do to treat them? Labs. Scan.    Any procedures or intervention occur? Consult to [facility]    Mental Status:  Level of Consciousness: Alert (0)    Psych Assessment:        Vitals:  Patient Vitals for the past 24 hrs:   BP Temp Temp src Pulse Resp SpO2   12/17/24 1157 (!) 156/81 -- -- 98 20 99 %   12/17/24 1118 (!) 155/97 -- -- 79 20 100 %   12/17/24 1009 -- 97.6 °F (36.4 °C) Oral -- -- --   12/17/24 0923 (!) 161/84 -- Oral 75 18 99 %        LDAs:   Peripheral IV 12/17/24 Left Antecubital (Active)   Site Assessment Clean, dry & intact 12/17/24 0935   Line Status Brisk blood return;Flushed;Normal saline locked 12/17/24 0935       Ambulatory Status:  No data recorded    Diagnosis:  DISPOSITION Admitted 12/17/2024 12:00:52 PM   Final diagnoses:   Transaminitis   Dilated cbd, acquired   Abdominal pain, right upper quadrant        Consults:  IP CONSULT TO GENERAL SURGERY  IP CONSULT TO GI     Pain Score:  Pain Assessment  Pain Assessment: 0-10  Pain Level: 9  Pain Location: Abdomen  Pain Orientation: Right, Upper  Pain Type: Acute pain  Pain Frequency: Continuous    C-SSRS:   Risk of Suicide: No Risk    Sepsis Screening:       [place] Fall Risk:       Swallow Screening        Preferred Language:   English      ALLERGIES     Patient has no known allergies.    SURGICAL HISTORY       Past Surgical History:   Procedure Laterality Date    ANKLE SURGERY  1984    in toeing    ANKLE SURGERY Bilateral 1996    remove staple-Dr Sanabria    TONSILLECTOMY AND ADENOIDECTOMY      as a child    UMBILICAL HERNIA REPAIR N/A 12/12/2019    HERNIA UMBILICAL REPAIR WITH

## 2024-12-17 NOTE — ED NOTES
Patient states his pain is back and he would like something else for pain. Patient appears in obvious distress and is uncomfortable

## 2024-12-17 NOTE — H&P
Data reviewed (unless otherwise discussed in assessment/plan)  EKG:  I have reviewed the EKG with the following interpretation: Normal sinus rhythm with no signs of obvious ischemia or arrhythmias.  Imaging: I have reviewed CT Abdomen with contrast with the following interpretation: Dilation of CBD to 9mm with no gallstones observed. Appendix and colon did not appear grossly inflamed.   Labs: Reviewed, see chart and plan above.     Electronically signed by Klaus Yusuf PA-C on 12/17/2024 at 11:41 AM.

## 2024-12-17 NOTE — ED PROVIDER NOTES
[facility] EMERGENCY DEPT      EMERGENCY MEDICINE     Pt Name: Aden Smith  MRN: [id number]  Birthdate 1980  Date of evaluation: 12/17/2024  Provider: JONNA Anthony CNP    CHIEF COMPLAINT       Chief Complaint   Patient presents with    Abdominal Pain     HISTORY OF PRESENT ILLNESS   Aden Smith is a pleasant 44 y.o. male who presents to the emergency department from home with c/o abdominal pain.  He has been having issues with RUQ pain.  His pcp has done labs that showed elevated LFT and a CT scan that showed a dilated CBD w/o stones.  Patient states that he is scheduled with GI and is supposed to have an US done but the pain became much worse today and he cannot tolerate it.  Nausea.  No fever.  Pain is RUQ and epigastric and radiates up to the chest.  Pain goes straight through to the back and the right shoulder blade.        History is obtained from:  patient  PASTMEDICAL HISTORY     Past Medical History:   Diagnosis Date    Anxiety     Chicken pox     Chronic low back pain     Fractures, stress 2012    right    GERD (gastroesophageal reflux disease)     Hypertension     no meds    Umbilical hernia 11/2019       Patient Active Problem List   Diagnosis Code    Umbilical hernia without obstruction and without gangrene K42.9    Hyponatremia E87.1    Diarrhea R19.7    Elevated liver enzymes R74.8    Hypophosphatemia E83.39    Hypokalemia E87.6    Subacute cough R05.2    Elevated liver function tests R79.89    Pneumonia of both lungs due to infectious organism J18.9     SURGICAL HISTORY       Past Surgical History:   Procedure Laterality Date    ANKLE SURGERY  1984    in toeing    ANKLE SURGERY Bilateral 1996    remove staple-Dr Sanabria    TONSILLECTOMY AND ADENOIDECTOMY      as a child    UMBILICAL HERNIA REPAIR N/A 12/12/2019    HERNIA UMBILICAL REPAIR WITH MESH performed by Michael Olvera MD at [facility] OR       CURRENT MEDICATIONS       Previous Medications    OMEPRAZOLE (PRILOSEC) 20

## 2024-12-18 ENCOUNTER — APPOINTMENT (OUTPATIENT)
Dept: NUCLEAR MEDICINE | Age: 44
DRG: 446 | End: 2024-12-18
Payer: COMMERCIAL

## 2024-12-18 LAB
AFP SERPL-MCNC: 2.6 UG/L
ALBUMIN SERPL BCG-MCNC: 3.3 G/DL (ref 3.5–5.1)
ALP SERPL-CCNC: 352 U/L (ref 38–126)
ALT SERPL W/O P-5'-P-CCNC: 228 U/L (ref 11–66)
ANION GAP SERPL CALC-SCNC: 13 MEQ/L (ref 8–16)
AST SERPL-CCNC: 146 U/L (ref 5–40)
BASOPHILS ABSOLUTE: 0 THOU/MM3 (ref 0–0.1)
BASOPHILS NFR BLD AUTO: 0.5 %
BILIRUB CONJ SERPL-MCNC: 5.2 MG/DL (ref 0.1–13.8)
BILIRUB SERPL-MCNC: 7 MG/DL (ref 0.3–1.2)
BILIRUB SERPL-MCNC: 7.7 MG/DL (ref 0.3–1.2)
BUN SERPL-MCNC: 11 MG/DL (ref 7–22)
CALCIUM SERPL-MCNC: 9.2 MG/DL (ref 8.5–10.5)
CANCER AG19-9 SERPL-ACNC: 48 U/ML (ref 0–35)
CHLORIDE SERPL-SCNC: 97 MEQ/L (ref 98–111)
CO2 SERPL-SCNC: 22 MEQ/L (ref 23–33)
CREAT SERPL-MCNC: 0.7 MG/DL (ref 0.4–1.2)
DEPRECATED RDW RBC AUTO: 42.7 FL (ref 35–45)
EOSINOPHIL NFR BLD AUTO: 4.4 %
EOSINOPHILS ABSOLUTE: 0.3 THOU/MM3 (ref 0–0.4)
ERYTHROCYTE [DISTWIDTH] IN BLOOD BY AUTOMATED COUNT: 12.4 % (ref 11.5–14.5)
GFR SERPL CREATININE-BSD FRML MDRD: > 90 ML/MIN/1.73M2
GLUCOSE SERPL-MCNC: 98 MG/DL (ref 70–108)
HCT VFR BLD AUTO: 41.2 % (ref 42–52)
HGB BLD-MCNC: 14.1 GM/DL (ref 14–18)
IMM GRANULOCYTES # BLD AUTO: 0.03 THOU/MM3 (ref 0–0.07)
IMM GRANULOCYTES NFR BLD AUTO: 0.5 %
LYMPHOCYTES ABSOLUTE: 1.1 THOU/MM3 (ref 1–4.8)
LYMPHOCYTES NFR BLD AUTO: 17.8 %
MCH RBC QN AUTO: 32 PG (ref 26–33)
MCHC RBC AUTO-ENTMCNC: 34.2 GM/DL (ref 32.2–35.5)
MCV RBC AUTO: 93.4 FL (ref 80–94)
MONOCYTES ABSOLUTE: 0.6 THOU/MM3 (ref 0.4–1.3)
MONOCYTES NFR BLD AUTO: 9.9 %
NEUTROPHILS ABSOLUTE: 4.3 THOU/MM3 (ref 1.8–7.7)
NEUTROPHILS NFR BLD AUTO: 66.9 %
NRBC BLD AUTO-RTO: 0 /100 WBC
PLATELET # BLD AUTO: 254 THOU/MM3 (ref 130–400)
PMV BLD AUTO: 9.5 FL (ref 9.4–12.4)
POTASSIUM SERPL-SCNC: 4 MEQ/L (ref 3.5–5.2)
PROT SERPL-MCNC: 6.7 G/DL (ref 6.1–8)
RBC # BLD AUTO: 4.41 MILL/MM3 (ref 4.7–6.1)
SODIUM SERPL-SCNC: 132 MEQ/L (ref 135–145)
WBC # BLD AUTO: 6.4 THOU/MM3 (ref 4.8–10.8)

## 2024-12-18 PROCEDURE — 6360000002 HC RX W HCPCS

## 2024-12-18 PROCEDURE — 85025 COMPLETE CBC W/AUTO DIFF WBC: CPT

## 2024-12-18 PROCEDURE — 82247 BILIRUBIN TOTAL: CPT

## 2024-12-18 PROCEDURE — 80076 HEPATIC FUNCTION PANEL: CPT

## 2024-12-18 PROCEDURE — 78226 HEPATOBILIARY SYSTEM IMAGING: CPT

## 2024-12-18 PROCEDURE — 99233 SBSQ HOSP IP/OBS HIGH 50: CPT | Performed by: STUDENT IN AN ORGANIZED HEALTH CARE EDUCATION/TRAINING PROGRAM

## 2024-12-18 PROCEDURE — A9537 TC99M MEBROFENIN: HCPCS | Performed by: NURSE PRACTITIONER

## 2024-12-18 PROCEDURE — 3430000000 HC RX DIAGNOSTIC RADIOPHARMACEUTICAL: Performed by: NURSE PRACTITIONER

## 2024-12-18 PROCEDURE — 99231 SBSQ HOSP IP/OBS SF/LOW 25: CPT | Performed by: SURGERY

## 2024-12-18 PROCEDURE — 80048 BASIC METABOLIC PNL TOTAL CA: CPT

## 2024-12-18 PROCEDURE — 1200000000 HC SEMI PRIVATE

## 2024-12-18 PROCEDURE — 82105 ALPHA-FETOPROTEIN SERUM: CPT

## 2024-12-18 PROCEDURE — 36415 COLL VENOUS BLD VENIPUNCTURE: CPT

## 2024-12-18 PROCEDURE — 80321 ALCOHOLS BIOMARKERS 1OR 2: CPT

## 2024-12-18 PROCEDURE — 2580000003 HC RX 258

## 2024-12-18 PROCEDURE — 86301 IMMUNOASSAY TUMOR CA 19-9: CPT

## 2024-12-18 PROCEDURE — 2500000003 HC RX 250 WO HCPCS

## 2024-12-18 RX ADMIN — PANTOPRAZOLE SODIUM 40 MG: 40 INJECTION, POWDER, FOR SOLUTION INTRAVENOUS at 15:08

## 2024-12-18 RX ADMIN — SODIUM CHLORIDE, PRESERVATIVE FREE 10 ML: 5 INJECTION INTRAVENOUS at 19:50

## 2024-12-18 RX ADMIN — Medication 8.6 MILLICURIE: at 08:30

## 2024-12-18 RX ADMIN — PIPERACILLIN AND TAZOBACTAM 3375 MG: 3; .375 INJECTION, POWDER, FOR SOLUTION INTRAVENOUS at 03:40

## 2024-12-18 RX ADMIN — SODIUM CHLORIDE, PRESERVATIVE FREE 10 ML: 5 INJECTION INTRAVENOUS at 08:04

## 2024-12-18 RX ADMIN — PIPERACILLIN AND TAZOBACTAM 3375 MG: 3; .375 INJECTION, POWDER, FOR SOLUTION INTRAVENOUS at 15:11

## 2024-12-18 RX ADMIN — PANTOPRAZOLE SODIUM 40 MG: 40 INJECTION, POWDER, FOR SOLUTION INTRAVENOUS at 19:50

## 2024-12-18 RX ADMIN — PIPERACILLIN AND TAZOBACTAM 3375 MG: 3; .375 INJECTION, POWDER, FOR SOLUTION INTRAVENOUS at 23:20

## 2024-12-18 ASSESSMENT — PAIN DESCRIPTION - LOCATION
LOCATION: BACK
LOCATION: BACK

## 2024-12-18 ASSESSMENT — PAIN DESCRIPTION - DESCRIPTORS: DESCRIPTORS: DISCOMFORT

## 2024-12-18 ASSESSMENT — PAIN SCALES - GENERAL
PAINLEVEL_OUTOF10: 2
PAINLEVEL_OUTOF10: 0
PAINLEVEL_OUTOF10: 2
PAINLEVEL_OUTOF10: 2

## 2024-12-18 ASSESSMENT — PAIN - FUNCTIONAL ASSESSMENT: PAIN_FUNCTIONAL_ASSESSMENT: ACTIVITIES ARE NOT PREVENTED

## 2024-12-18 ASSESSMENT — PAIN DESCRIPTION - PAIN TYPE: TYPE: ACUTE PAIN

## 2024-12-18 ASSESSMENT — PAIN DESCRIPTION - ORIENTATION: ORIENTATION: MID;UPPER

## 2024-12-18 ASSESSMENT — PAIN DESCRIPTION - FREQUENCY: FREQUENCY: CONTINUOUS

## 2024-12-18 NOTE — PROGRESS NOTES
Gastroenterology Progress Note:     Patient Name:  Aden Smith   MRN: [id number]  [id number]  YOB: 1980  Admit Date: 12/17/2024  9:18 AM  Primary Care Physician: Michelet Hernandez APRN - CNP   [id number]     Patient seen and examined.  24 hours events and chart reviewed.    Subjective:      Objective:  /86   Pulse 80   Temp 97.9 °F (36.6 °C) (Oral)   Resp 16   Ht 1.803 m (5' 11\")   Wt 93.1 kg (205 lb 4 oz)   SpO2 97%   BMI 28.63 kg/m²     Physical Exam  Vitals and nursing note reviewed.   Constitutional:       General: He is not in acute distress.     Appearance: Normal appearance. He is normal weight. He is not ill-appearing.   HENT:      Mouth/Throat:      Mouth: Mucous membranes are moist.      Pharynx: Oropharynx is clear.   Eyes:      General: Scleral icterus present.      Pupils: Pupils are equal, round, and reactive to light.   Cardiovascular:      Rate and Rhythm: Normal rate and regular rhythm.      Pulses: Normal pulses.      Heart sounds: Normal heart sounds. No murmur heard.  Pulmonary:      Effort: Pulmonary effort is normal. No respiratory distress.      Breath sounds: Normal breath sounds. No stridor. No wheezing, rhonchi or rales.   Chest:      Chest wall: No tenderness.   Abdominal:      General: Abdomen is flat. Bowel sounds are normal. There is no distension.      Palpations: Abdomen is soft. There is no mass.      Tenderness: There is no abdominal tenderness. There is no guarding or rebound.      Hernia: No hernia is present.   Skin:     General: Skin is warm and dry.      Capillary Refill: Capillary refill takes less than 2 seconds.      Coloration: Skin is jaundiced. Skin is not pale.   Neurological:      Mental Status: He is alert and oriented to person, place, and time.   Psychiatric:         Mood and Affect: Mood normal.         Behavior: Behavior normal.         Thought Content: Thought content normal.         Judgment: Judgment normal.           Labs:   CBC: 
To hida scan per cart  
cramping in his right upper/epigastric region.  He states it is aggravated by food and only alleviated by fasting.  He denies any positional changes to the pain.  He endorses radiation to the back and to the right shoulder blade  He endorses subjective fever and chills, change in color of urine to a \"possible bloody color\" and endorses lightening color of the stool.  He denies any nausea or vomiting.  He denies any confusion or weakness.  He denies any diarrhea or constipation     Couple months at most. Sunday was worst. Several hours. No Hx of gallbladder problems. Any amount of food aggravates. Fasting is only alleviating. Pain cramping 10/10, pain meds dropped to dull 1.5/10. radiates to back, different than typical back pain.      ED course: Patient presented to the emergency room due to pain.  Ultrasound of the gallbladder was taken and revealed a dilated CBD of 1.4 mm.  Sonographer also noted a positive Aceves sign.  Patient was given morphine and fentanyl all with significant pain relief however it quickly returned. :    Medications:    Infusion Medications    sodium chloride      Scheduled Medications    sodium chloride flush  5-40 mL IntraVENous 2 times per day    piperacillin-tazobactam  3,375 mg IntraVENous Q8H    pantoprazole  40 mg IntraVENous BID    PRN Meds: sodium chloride flush, sodium chloride, potassium chloride **OR** potassium alternative oral replacement **OR** potassium chloride, magnesium sulfate, ondansetron **OR** ondansetron, polyethylene glycol, HYDROmorphone **OR** HYDROmorphone    Exam:  /86   Pulse 80   Temp 97.9 °F (36.6 °C) (Oral)   Resp 16   Ht 1.803 m (5' 11\")   Wt 93.1 kg (205 lb 4 oz)   SpO2 97%   BMI 28.63 kg/m²   General: No distress, appears stated age.  Eyes:  PERRL. Conjunctivae/corneas clear.  HENT: Head normal appearing. Nares normal. Oral mucosa moist.  Hearing intact.   Neck: Supple, with full range of motion. Trachea midline.  No gross JVD 
quadrant.     FINDINGS:        Liver - L= 21.8 cm  Gallbladder - 12.8 x 3.4 x 3.6 cm  Gallbladder Wall - 0.20 cm  Common Duct - 1.4 cm  Aceves's Sign: positive         There is an enlarged echogenic liver.. No masses are noted.  The pancreatic head  and body are within normal limits. The tail is not well seen due to overlying  bowel gas.     The gallbladder is distended. There is no pericholecystic fluid or gallbladder  wall edema.  No gallstones are identified. The common bile duct is dilated and  measures 1.4 cm in diameter.      There is no hydronephrosis in the visualized aspects of the right kidney.     IMPRESSION:     1. Enlarged echogenic liver.  2. Distended gallbladder. Dilated common bile duct which measures 1.4 cm in  diameter. MRCP may be helpful for better evaluation in this patient.     **This report has been created using voice recognition software. It may contain  minor errors which are inherent in voice recognition technology.**     Electronically signed by Dr. Helen Olsen           Exam Ended: 12/17/24 10:28 EST Last Resulted: 12/17/24 11:00 EST        Total time spent in care of patient:  20 minutes collectively between subjective/objective examination, chart review, documentation, clinical reasoning and discussion with attending regarding plan/interval changes. More than 50% of the time involved with counseling, education and coordinating care.      Electronically signed by Patti Vail PA-C on 12/18/2024 at 3:01 PM      Patient seen and examined independently by me earlier this AM on 12/18/2024  Total time personally spent on this patient encounter was 27 minutes which includes :  Preparing to see the patient( reviewing tests and chart)  Obtaining and reviewing separately obtained history  Performing a medically appropriate examination and evaluation  Ordering medications, tests, or procedures  Counseling and educating the patient/family/caregiver  Care coordination  Referring and

## 2024-12-18 NOTE — CARE COORDINATION
the providers?  No     Case Management Assessment Initial Evaluation    Date/Time of Evaluation: 2024 2:56 PM  Assessment Completed by: Beata Zhao RN    If patient is discharged prior to next notation, then this note serves as note for discharge by case management.    Patient Name: Aden Smith                   YOB: 1980  Diagnosis: Abdominal pain, right upper quadrant [R10.11]  Cholecystitis [K81.9]  Transaminitis [R74.01]  Dilated cbd, acquired [K83.8]                   Date / Time: 2024  9:18 AM  Location: [facility]016-A     Patient Admission Status: Inpatient   Readmission Risk Low 0-14, Mod 15-19), High > 20: Readmission Risk Score: 7.1    Current PCP: Michelet Hernandez APRN - CNP  Health Care Decision Makers:   Primary Decision Maker: Renata Smith - Parent - [phone]    Additional Case Management Notes: Patient presents with right upper quadrant abdominal pain. His pcp has done labs that showed elevated LFT and a CT scan that showed a dilated CBD w/o stones per chart. Hospitalist attending, General Surgery and GI consulted, IV Protonix, Zosyn, prn Dilaudid and Zofran, electrolyte replacement protocol, daily LFT's, full liquid diet, SCD's, up as tolerated.     Procedures:    MRCP:  IMPRESSION:  1. The common bile duct is dilated at 1.5 cm. No biliary calculus is seen.  2. Mild fatty infiltration of the liver. Marked hepatomegaly.  3. Mild pancreatic duct dilatation.  4. Other findings as described above.    Imagin/18 HIDA:    IMPRESSION:  Radiotracer is never visualized entering the small intestine despite obtaining  delayed images 4 hours out. This is concerning for biliary obstruction or a  disorder of the biliary sphincter.    Patient Goals/Plan/Treatment Preferences: Aden resides at home alone. He is able to afford his medications and does not have a need for DME or services at discharge. He will have transportation to home.

## 2024-12-18 NOTE — PLAN OF CARE
Problem: Discharge Planning  Goal: Discharge to home or other facility with appropriate resources  Outcome: Progressing  Flowsheets (Taken 12/18/2024 0754)  Discharge to home or other facility with appropriate resources:   Identify barriers to discharge with patient and caregiver   Arrange for needed discharge resources and transportation as appropriate   Identify discharge learning needs (meds, wound care, etc)   Refer to discharge planning if patient needs post-hospital services based on physician order or complex needs related to functional status, cognitive ability or social support system     Problem: Pain  Goal: Verbalizes/displays adequate comfort level or baseline comfort level  Outcome: Progressing  Flowsheets (Taken 12/18/2024 0755)  Verbalizes/displays adequate comfort level or baseline comfort level:   Encourage patient to monitor pain and request assistance   Assess pain using appropriate pain scale   Administer analgesics based on type and severity of pain and evaluate response   Implement non-pharmacological measures as appropriate and evaluate response   Consider cultural and social influences on pain and pain management   Notify Licensed Independent Practitioner if interventions unsuccessful or patient reports new pain     Problem: Safety - Adult  Goal: Free from fall injury  Outcome: Progressing  Flowsheets (Taken 12/18/2024 1627)  Free From Fall Injury: Instruct family/caregiver on patient safety     Problem: Gastrointestinal - Adult  Goal: Minimal or absence of nausea and vomiting  Outcome: Progressing  Flowsheets (Taken 12/18/2024 1627)  Minimal or absence of nausea and vomiting:   Provide nonpharmacologic comfort measures as appropriate   Administer ordered antiemetic medications as needed     Problem: Gastrointestinal - Adult  Goal: Maintains or returns to baseline bowel function  Outcome: Progressing  Flowsheets (Taken 12/18/2024 1627)  Maintains or returns to baseline bowel function:

## 2024-12-19 VITALS
HEART RATE: 78 BPM | TEMPERATURE: 98.4 F | HEIGHT: 71 IN | RESPIRATION RATE: 16 BRPM | SYSTOLIC BLOOD PRESSURE: 122 MMHG | DIASTOLIC BLOOD PRESSURE: 82 MMHG | WEIGHT: 205.25 LBS | OXYGEN SATURATION: 99 % | BODY MASS INDEX: 28.73 KG/M2

## 2024-12-19 LAB
ALBUMIN SERPL BCG-MCNC: 3.3 G/DL (ref 3.5–5.1)
ALP SERPL-CCNC: 343 U/L (ref 38–126)
ALT SERPL W/O P-5'-P-CCNC: 185 U/L (ref 11–66)
ANION GAP SERPL CALC-SCNC: 13 MEQ/L (ref 8–16)
AST SERPL-CCNC: 116 U/L (ref 5–40)
BILIRUB CONJ SERPL-MCNC: 5.1 MG/DL (ref 0.1–13.8)
BILIRUB SERPL-MCNC: 6.7 MG/DL (ref 0.3–1.2)
BUN SERPL-MCNC: 10 MG/DL (ref 7–22)
CALCIUM SERPL-MCNC: 9.3 MG/DL (ref 8.5–10.5)
CHLORIDE SERPL-SCNC: 103 MEQ/L (ref 98–111)
CO2 SERPL-SCNC: 21 MEQ/L (ref 23–33)
CREAT SERPL-MCNC: 0.6 MG/DL (ref 0.4–1.2)
DEPRECATED RDW RBC AUTO: 43.5 FL (ref 35–45)
ERYTHROCYTE [DISTWIDTH] IN BLOOD BY AUTOMATED COUNT: 12.4 % (ref 11.5–14.5)
GFR SERPL CREATININE-BSD FRML MDRD: > 90 ML/MIN/1.73M2
GLUCOSE SERPL-MCNC: 103 MG/DL (ref 70–108)
HCT VFR BLD AUTO: 42.2 % (ref 42–52)
HGB BLD-MCNC: 14.2 GM/DL (ref 14–18)
MCH RBC QN AUTO: 32 PG (ref 26–33)
MCHC RBC AUTO-ENTMCNC: 33.6 GM/DL (ref 32.2–35.5)
MCV RBC AUTO: 95 FL (ref 80–94)
PLATELET # BLD AUTO: 281 THOU/MM3 (ref 130–400)
PMV BLD AUTO: 9.5 FL (ref 9.4–12.4)
POTASSIUM SERPL-SCNC: 4.2 MEQ/L (ref 3.5–5.2)
PROT SERPL-MCNC: 6.9 G/DL (ref 6.1–8)
RBC # BLD AUTO: 4.44 MILL/MM3 (ref 4.7–6.1)
SODIUM SERPL-SCNC: 137 MEQ/L (ref 135–145)
WBC # BLD AUTO: 6.6 THOU/MM3 (ref 4.8–10.8)

## 2024-12-19 PROCEDURE — 82248 BILIRUBIN DIRECT: CPT

## 2024-12-19 PROCEDURE — 85027 COMPLETE CBC AUTOMATED: CPT

## 2024-12-19 PROCEDURE — 36415 COLL VENOUS BLD VENIPUNCTURE: CPT

## 2024-12-19 PROCEDURE — 80053 COMPREHEN METABOLIC PANEL: CPT

## 2024-12-19 PROCEDURE — 99239 HOSP IP/OBS DSCHRG MGMT >30: CPT | Performed by: STUDENT IN AN ORGANIZED HEALTH CARE EDUCATION/TRAINING PROGRAM

## 2024-12-19 ASSESSMENT — PAIN SCALES - GENERAL: PAINLEVEL_OUTOF10: 2

## 2024-12-20 LAB
LABORATORY REPORT: NORMAL
PETH INTERPRETATION: NORMAL
PLPETH BLD-MCNC: 285 NG/ML
POPETH BLD-MCNC: 352 NG/ML

## 2024-12-22 LAB
BACTERIA BLD AEROBE CULT: NORMAL
BACTERIA BLD AEROBE CULT: NORMAL

## 2025-02-28 RX ORDER — PANTOPRAZOLE SODIUM 40 MG/1
TABLET, DELAYED RELEASE ORAL
Qty: 90 TABLET | Refills: 1 | Status: SHIPPED | OUTPATIENT
Start: 2025-02-28

## 2025-04-02 RX ORDER — PANTOPRAZOLE SODIUM 40 MG/1
40 TABLET, DELAYED RELEASE ORAL DAILY
Qty: 90 TABLET | Refills: 1 | Status: SHIPPED | OUTPATIENT
Start: 2025-04-02 | End: 2025-09-29

## 2025-04-02 NOTE — TELEPHONE ENCOUNTER
Last visit- 12/12/2024  Next visit- Visit date not found    Requested Prescriptions     Pending Prescriptions Disp Refills    pantoprazole (PROTONIX) 40 MG tablet 90 tablet 1     Sig: Take 1 tablet by mouth daily TAKE ONE (1) TABLET BY MOUTH EVERY MORNING (BEFORE BREAKFAST)         Patient called and requested a refill on this medication. Stated he used to be on Prilosec but was not helping and this helps him a great deal. He also stated that if Michelet doesn't want to call this in again for him, he can call him and he will explain why he needs it.

## 2025-07-28 RX ORDER — ALBUTEROL SULFATE 90 UG/1
2 INHALANT RESPIRATORY (INHALATION) EVERY 6 HOURS PRN
Qty: 18 G | Refills: 2 | Status: SHIPPED | OUTPATIENT
Start: 2025-07-28

## 2025-07-28 RX ORDER — ALBUTEROL SULFATE 90 UG/1
2 INHALANT RESPIRATORY (INHALATION) EVERY 6 HOURS PRN
COMMUNITY
End: 2025-07-28 | Stop reason: SDUPTHER

## 2025-07-28 NOTE — TELEPHONE ENCOUNTER
Last visit- 12/12/2024  Next visit- Visit date not found    Requested Prescriptions     Pending Prescriptions Disp Refills    albuterol sulfate HFA (VENTOLIN HFA) 108 (90 Base) MCG/ACT inhaler 18 g 2     Sig: Inhale 2 puffs into the lungs every 6 hours as needed for Wheezing

## (undated) DEVICE — BLADE CLIPPER GEN PURP NS

## (undated) DEVICE — BREAST HERNIA PACK: Brand: MEDLINE INDUSTRIES, INC.

## (undated) DEVICE — GLOVE ORANGE PI 8 1/2   MSG9085

## (undated) DEVICE — BASIC SINGLE BASIN BTC-LF: Brand: MEDLINE INDUSTRIES, INC.

## (undated) DEVICE — GLOVE SURG SZ 65 THK91MIL LTX FREE SYN POLYISOPRENE

## (undated) DEVICE — ROYAL SILK SURGICAL GOWN, XXL: Brand: CONVERTORS

## (undated) DEVICE — GLOVE ORANGE PI 8   MSG9080

## (undated) DEVICE — COVER ARMBRD W13XL28.5IN IMPERV BLU FOR OP RM